# Patient Record
Sex: FEMALE | Race: WHITE | NOT HISPANIC OR LATINO | Employment: FULL TIME | URBAN - METROPOLITAN AREA
[De-identification: names, ages, dates, MRNs, and addresses within clinical notes are randomized per-mention and may not be internally consistent; named-entity substitution may affect disease eponyms.]

---

## 2018-01-01 ENCOUNTER — HOSPITAL ENCOUNTER (OUTPATIENT)
Dept: ULTRASOUND IMAGING | Facility: HOSPITAL | Age: 58
Discharge: HOME | End: 2018-11-28
Admitting: FAMILY MEDICINE

## 2018-01-01 ENCOUNTER — HOSPITAL ENCOUNTER (EMERGENCY)
Facility: HOSPITAL | Age: 58
Discharge: HOME | End: 2018-08-20
Attending: EMERGENCY MEDICINE | Admitting: EMERGENCY MEDICINE

## 2018-01-01 ENCOUNTER — APPOINTMENT (OUTPATIENT)
Dept: GENERAL RADIOLOGY | Facility: HOSPITAL | Age: 58
End: 2018-01-01

## 2018-01-01 ENCOUNTER — HOSPITAL ENCOUNTER (OUTPATIENT)
Facility: HOSPITAL | Age: 58
Setting detail: HOSPITAL OUTPATIENT SURGERY
Discharge: HOME | End: 2018-12-27
Attending: RADIOLOGY | Admitting: RADIOLOGY

## 2018-01-01 ENCOUNTER — APPOINTMENT (OUTPATIENT)
Dept: ULTRASOUND IMAGING | Facility: HOSPITAL | Age: 58
End: 2018-01-01

## 2018-01-01 ENCOUNTER — TRANSCRIBE ORDERS (OUTPATIENT)
Dept: ADMINISTRATIVE | Facility: HOSPITAL | Age: 58
End: 2018-01-01

## 2018-01-01 ENCOUNTER — PREP FOR SURGERY (OUTPATIENT)
Dept: OTHER | Facility: HOSPITAL | Age: 58
End: 2018-01-01

## 2018-01-01 ENCOUNTER — APPOINTMENT (OUTPATIENT)
Dept: MRI IMAGING | Facility: HOSPITAL | Age: 58
End: 2018-01-01

## 2018-01-01 ENCOUNTER — OFFICE VISIT (OUTPATIENT)
Dept: NEUROSURGERY | Facility: CLINIC | Age: 58
End: 2018-01-01

## 2018-01-01 VITALS
TEMPERATURE: 97.7 F | HEART RATE: 76 BPM | RESPIRATION RATE: 18 BRPM | OXYGEN SATURATION: 96 % | WEIGHT: 293 LBS | SYSTOLIC BLOOD PRESSURE: 154 MMHG | DIASTOLIC BLOOD PRESSURE: 74 MMHG | BODY MASS INDEX: 50.02 KG/M2 | HEIGHT: 64 IN

## 2018-01-01 VITALS
WEIGHT: 293 LBS | SYSTOLIC BLOOD PRESSURE: 130 MMHG | HEIGHT: 64 IN | DIASTOLIC BLOOD PRESSURE: 76 MMHG | BODY MASS INDEX: 50.02 KG/M2 | TEMPERATURE: 97.7 F

## 2018-01-01 VITALS
DIASTOLIC BLOOD PRESSURE: 77 MMHG | HEIGHT: 64 IN | OXYGEN SATURATION: 92 % | HEART RATE: 71 BPM | RESPIRATION RATE: 20 BRPM | SYSTOLIC BLOOD PRESSURE: 140 MMHG | BODY MASS INDEX: 48.65 KG/M2 | TEMPERATURE: 98 F | WEIGHT: 285 LBS

## 2018-01-01 DIAGNOSIS — G45.0 VERTEBROBASILAR INSUFFICIENCY: ICD-10-CM

## 2018-01-01 DIAGNOSIS — R42 VERTIGO: Primary | ICD-10-CM

## 2018-01-01 DIAGNOSIS — R10.11 RUQ PAIN: ICD-10-CM

## 2018-01-01 DIAGNOSIS — I65.03 STENOSIS OF BOTH VERTEBRAL ARTERIES: ICD-10-CM

## 2018-01-01 DIAGNOSIS — G45.0 VERTEBROBASILAR ARTERY INSUFFICIENCY: Primary | ICD-10-CM

## 2018-01-01 DIAGNOSIS — R10.11 RUQ PAIN: Primary | ICD-10-CM

## 2018-01-01 DIAGNOSIS — G45.0 VERTEBROBASILAR INSUFFICIENCY: Primary | ICD-10-CM

## 2018-01-01 LAB
ALBUMIN SERPL-MCNC: 4.19 G/DL (ref 3.2–4.8)
ALBUMIN/GLOB SERPL: 1.6 G/DL (ref 1.5–2.5)
ALP SERPL-CCNC: 128 U/L (ref 25–100)
ALT SERPL W P-5'-P-CCNC: 11 U/L (ref 7–40)
ANION GAP SERPL CALCULATED.3IONS-SCNC: 6 MMOL/L (ref 3–11)
ANION GAP SERPL CALCULATED.3IONS-SCNC: 8 MMOL/L (ref 3–11)
ARTICHOKE IGE QN: 102 MG/DL (ref 0–130)
AST SERPL-CCNC: 14 U/L (ref 0–33)
BASOPHILS # BLD AUTO: 0.06 10*3/MM3 (ref 0–0.2)
BASOPHILS NFR BLD AUTO: 0.4 % (ref 0–1)
BILIRUB SERPL-MCNC: 0.5 MG/DL (ref 0.3–1.2)
BUN BLD-MCNC: 14 MG/DL (ref 9–23)
BUN BLD-MCNC: 18 MG/DL (ref 9–23)
BUN/CREAT SERPL: 16.1 (ref 7–25)
BUN/CREAT SERPL: 18.8 (ref 7–25)
CALCIUM SPEC-SCNC: 9.1 MG/DL (ref 8.7–10.4)
CALCIUM SPEC-SCNC: 9.1 MG/DL (ref 8.7–10.4)
CHLORIDE SERPL-SCNC: 103 MMOL/L (ref 99–109)
CHLORIDE SERPL-SCNC: 105 MMOL/L (ref 99–109)
CHOLEST SERPL-MCNC: 159 MG/DL (ref 0–200)
CO2 SERPL-SCNC: 29 MMOL/L (ref 20–31)
CO2 SERPL-SCNC: 29 MMOL/L (ref 20–31)
CREAT BLD-MCNC: 0.87 MG/DL (ref 0.6–1.3)
CREAT BLD-MCNC: 0.96 MG/DL (ref 0.6–1.3)
DEPRECATED RDW RBC AUTO: 49 FL (ref 37–54)
DEPRECATED RDW RBC AUTO: 52.8 FL (ref 37–54)
EOSINOPHIL # BLD AUTO: 0.4 10*3/MM3 (ref 0–0.3)
EOSINOPHIL NFR BLD AUTO: 2.7 % (ref 0–3)
ERYTHROCYTE [DISTWIDTH] IN BLOOD BY AUTOMATED COUNT: 16.3 % (ref 11.3–14.5)
ERYTHROCYTE [DISTWIDTH] IN BLOOD BY AUTOMATED COUNT: 16.9 % (ref 11.3–14.5)
GFR SERPL CREATININE-BSD FRML MDRD: 59 ML/MIN/1.73
GFR SERPL CREATININE-BSD FRML MDRD: 67 ML/MIN/1.73
GLOBULIN UR ELPH-MCNC: 2.6 GM/DL
GLUCOSE BLD-MCNC: 116 MG/DL (ref 70–100)
GLUCOSE BLD-MCNC: 254 MG/DL (ref 70–100)
GLUCOSE BLDC GLUCOMTR-MCNC: 87 MG/DL (ref 70–130)
HBA1C MFR BLD: 8.1 % (ref 4.8–5.6)
HBA1C MFR BLD: 9.3 % (ref 4.8–5.6)
HCT VFR BLD AUTO: 45.1 % (ref 34.5–44)
HCT VFR BLD AUTO: 46 % (ref 34.5–44)
HDLC SERPL-MCNC: 41 MG/DL (ref 40–60)
HGB BLD-MCNC: 13.7 G/DL (ref 11.5–15.5)
HGB BLD-MCNC: 13.9 G/DL (ref 11.5–15.5)
HOLD SPECIMEN: NORMAL
HOLD SPECIMEN: NORMAL
IMM GRANULOCYTES # BLD: 0.07 10*3/MM3 (ref 0–0.03)
IMM GRANULOCYTES NFR BLD: 0.5 % (ref 0–0.6)
LYMPHOCYTES # BLD AUTO: 2.18 10*3/MM3 (ref 0.6–4.8)
LYMPHOCYTES NFR BLD AUTO: 14.5 % (ref 24–44)
MAGNESIUM SERPL-MCNC: 1.9 MG/DL (ref 1.3–2.7)
MCH RBC QN AUTO: 24.7 PG (ref 27–31)
MCH RBC QN AUTO: 25.7 PG (ref 27–31)
MCHC RBC AUTO-ENTMCNC: 30.2 G/DL (ref 32–36)
MCHC RBC AUTO-ENTMCNC: 30.4 G/DL (ref 32–36)
MCV RBC AUTO: 81.9 FL (ref 80–99)
MCV RBC AUTO: 84.5 FL (ref 80–99)
MONOCYTES # BLD AUTO: 0.85 10*3/MM3 (ref 0–1)
MONOCYTES NFR BLD AUTO: 5.6 % (ref 0–12)
NEUTROPHILS # BLD AUTO: 11.58 10*3/MM3 (ref 1.5–8.3)
NEUTROPHILS NFR BLD AUTO: 76.8 % (ref 41–71)
PLATELET # BLD AUTO: 263 10*3/MM3 (ref 150–450)
PLATELET # BLD AUTO: 286 10*3/MM3 (ref 150–450)
PMV BLD AUTO: 10.1 FL (ref 6–12)
PMV BLD AUTO: 9.6 FL (ref 6–12)
POTASSIUM BLD-SCNC: 4.2 MMOL/L (ref 3.5–5.5)
POTASSIUM BLD-SCNC: 4.5 MMOL/L (ref 3.5–5.5)
PROT SERPL-MCNC: 6.8 G/DL (ref 5.7–8.2)
RBC # BLD AUTO: 5.34 10*6/MM3 (ref 3.89–5.14)
RBC # BLD AUTO: 5.62 10*6/MM3 (ref 3.89–5.14)
SODIUM BLD-SCNC: 138 MMOL/L (ref 132–146)
SODIUM BLD-SCNC: 142 MMOL/L (ref 132–146)
TRIGL SERPL-MCNC: 146 MG/DL (ref 0–150)
TROPONIN I SERPL-MCNC: 0 NG/ML (ref 0–0.07)
TROPONIN I SERPL-MCNC: 0.01 NG/ML
WBC NRBC COR # BLD: 13.7 10*3/MM3 (ref 3.5–10.8)
WBC NRBC COR # BLD: 15.07 10*3/MM3 (ref 3.5–10.8)
WHOLE BLOOD HOLD SPECIMEN: NORMAL
WHOLE BLOOD HOLD SPECIMEN: NORMAL

## 2018-01-01 PROCEDURE — 85027 COMPLETE CBC AUTOMATED: CPT | Performed by: RADIOLOGY

## 2018-01-01 PROCEDURE — C1769 GUIDE WIRE: HCPCS | Performed by: RADIOLOGY

## 2018-01-01 PROCEDURE — C1894 INTRO/SHEATH, NON-LASER: HCPCS | Performed by: RADIOLOGY

## 2018-01-01 PROCEDURE — 83735 ASSAY OF MAGNESIUM: CPT | Performed by: EMERGENCY MEDICINE

## 2018-01-01 PROCEDURE — 84484 ASSAY OF TROPONIN QUANT: CPT | Performed by: PHYSICIAN ASSISTANT

## 2018-01-01 PROCEDURE — 80061 LIPID PANEL: CPT | Performed by: RADIOLOGY

## 2018-01-01 PROCEDURE — 36226 PLACE CATH VERTEBRAL ART: CPT | Performed by: RADIOLOGY

## 2018-01-01 PROCEDURE — 70548 MR ANGIOGRAPHY NECK W/DYE: CPT

## 2018-01-01 PROCEDURE — 80053 COMPREHEN METABOLIC PANEL: CPT | Performed by: EMERGENCY MEDICINE

## 2018-01-01 PROCEDURE — 70551 MRI BRAIN STEM W/O DYE: CPT

## 2018-01-01 PROCEDURE — 25010000002 FENTANYL CITRATE (PF) 100 MCG/2ML SOLUTION: Performed by: RADIOLOGY

## 2018-01-01 PROCEDURE — C1760 CLOSURE DEV, VASC: HCPCS | Performed by: RADIOLOGY

## 2018-01-01 PROCEDURE — 0 IODIXANOL PER 1 ML: Performed by: RADIOLOGY

## 2018-01-01 PROCEDURE — 82962 GLUCOSE BLOOD TEST: CPT

## 2018-01-01 PROCEDURE — 71045 X-RAY EXAM CHEST 1 VIEW: CPT

## 2018-01-01 PROCEDURE — 70544 MR ANGIOGRAPHY HEAD W/O DYE: CPT

## 2018-01-01 PROCEDURE — 36225 PLACE CATH SUBCLAVIAN ART: CPT | Performed by: RADIOLOGY

## 2018-01-01 PROCEDURE — 99285 EMERGENCY DEPT VISIT HI MDM: CPT

## 2018-01-01 PROCEDURE — 36415 COLL VENOUS BLD VENIPUNCTURE: CPT

## 2018-01-01 PROCEDURE — 84484 ASSAY OF TROPONIN QUANT: CPT

## 2018-01-01 PROCEDURE — 25010000002 MIDAZOLAM PER 1 MG: Performed by: RADIOLOGY

## 2018-01-01 PROCEDURE — 36223 PLACE CATH CAROTID/INOM ART: CPT | Performed by: RADIOLOGY

## 2018-01-01 PROCEDURE — 83036 HEMOGLOBIN GLYCOSYLATED A1C: CPT | Performed by: RADIOLOGY

## 2018-01-01 PROCEDURE — A9577 INJ MULTIHANCE: HCPCS | Performed by: EMERGENCY MEDICINE

## 2018-01-01 PROCEDURE — 80048 BASIC METABOLIC PNL TOTAL CA: CPT | Performed by: RADIOLOGY

## 2018-01-01 PROCEDURE — 83036 HEMOGLOBIN GLYCOSYLATED A1C: CPT | Performed by: PHYSICIAN ASSISTANT

## 2018-01-01 PROCEDURE — 0 GADOBENATE DIMEGLUMINE 529 MG/ML SOLUTION: Performed by: EMERGENCY MEDICINE

## 2018-01-01 PROCEDURE — 76705 ECHO EXAM OF ABDOMEN: CPT

## 2018-01-01 PROCEDURE — 93005 ELECTROCARDIOGRAM TRACING: CPT | Performed by: EMERGENCY MEDICINE

## 2018-01-01 PROCEDURE — 99204 OFFICE O/P NEW MOD 45 MIN: CPT | Performed by: RADIOLOGY

## 2018-01-01 PROCEDURE — 93005 ELECTROCARDIOGRAM TRACING: CPT

## 2018-01-01 PROCEDURE — 85025 COMPLETE CBC W/AUTO DIFF WBC: CPT | Performed by: EMERGENCY MEDICINE

## 2018-01-01 RX ORDER — SODIUM CHLORIDE 0.9 % (FLUSH) 0.9 %
3 SYRINGE (ML) INJECTION EVERY 12 HOURS SCHEDULED
Status: DISCONTINUED | OUTPATIENT
Start: 2018-01-01 | End: 2018-01-01 | Stop reason: HOSPADM

## 2018-01-01 RX ORDER — CLOPIDOGREL BISULFATE 75 MG/1
75 TABLET ORAL DAILY
COMMUNITY
Start: 2018-01-01

## 2018-01-01 RX ORDER — MECLIZINE HYDROCHLORIDE 25 MG/1
25 TABLET ORAL EVERY 6 HOURS PRN
Qty: 28 TABLET | Refills: 0 | Status: SHIPPED | OUTPATIENT
Start: 2018-01-01

## 2018-01-01 RX ORDER — ASPIRIN 81 MG/1
81 TABLET, COATED ORAL DAILY
COMMUNITY
Start: 2018-01-01

## 2018-01-01 RX ORDER — FENTANYL CITRATE 50 UG/ML
INJECTION, SOLUTION INTRAMUSCULAR; INTRAVENOUS AS NEEDED
Status: DISCONTINUED | OUTPATIENT
Start: 2018-01-01 | End: 2018-01-01 | Stop reason: HOSPADM

## 2018-01-01 RX ORDER — SODIUM CHLORIDE 9 MG/ML
50 INJECTION, SOLUTION INTRAVENOUS CONTINUOUS
Status: DISCONTINUED | OUTPATIENT
Start: 2018-01-01 | End: 2018-01-01 | Stop reason: HOSPADM

## 2018-01-01 RX ORDER — MECLIZINE HYDROCHLORIDE 25 MG/1
50 TABLET ORAL ONCE
Status: COMPLETED | OUTPATIENT
Start: 2018-01-01 | End: 2018-01-01

## 2018-01-01 RX ORDER — MIDAZOLAM HYDROCHLORIDE 1 MG/ML
INJECTION INTRAMUSCULAR; INTRAVENOUS AS NEEDED
Status: DISCONTINUED | OUTPATIENT
Start: 2018-01-01 | End: 2018-01-01 | Stop reason: HOSPADM

## 2018-01-01 RX ORDER — SODIUM CHLORIDE 0.9 % (FLUSH) 0.9 %
3-10 SYRINGE (ML) INJECTION AS NEEDED
Status: DISCONTINUED | OUTPATIENT
Start: 2018-01-01 | End: 2018-01-01 | Stop reason: HOSPADM

## 2018-01-01 RX ORDER — ATORVASTATIN CALCIUM 80 MG/1
80 TABLET, FILM COATED ORAL NIGHTLY
COMMUNITY
Start: 2018-01-01

## 2018-01-01 RX ORDER — SODIUM CHLORIDE 9 MG/ML
75 INJECTION, SOLUTION INTRAVENOUS CONTINUOUS
Status: ACTIVE | OUTPATIENT
Start: 2018-01-01 | End: 2018-01-01

## 2018-01-01 RX ORDER — SODIUM CHLORIDE 0.9 % (FLUSH) 0.9 %
1-10 SYRINGE (ML) INJECTION AS NEEDED
Status: DISCONTINUED | OUTPATIENT
Start: 2018-01-01 | End: 2018-01-01 | Stop reason: HOSPADM

## 2018-01-01 RX ORDER — SODIUM CHLORIDE 9 MG/ML
50 INJECTION, SOLUTION INTRAVENOUS CONTINUOUS
Status: CANCELLED | OUTPATIENT
Start: 2018-01-01

## 2018-01-01 RX ORDER — IODIXANOL 320 MG/ML
INJECTION, SOLUTION INTRAVASCULAR AS NEEDED
Status: DISCONTINUED | OUTPATIENT
Start: 2018-01-01 | End: 2018-01-01 | Stop reason: HOSPADM

## 2018-01-01 RX ORDER — LEVOTHYROXINE SODIUM 0.1 MG/1
100 TABLET ORAL DAILY
COMMUNITY
Start: 2018-01-01

## 2018-01-01 RX ORDER — CARVEDILOL 12.5 MG/1
12.5 TABLET ORAL 2 TIMES DAILY WITH MEALS
COMMUNITY
Start: 2018-01-01

## 2018-01-01 RX ORDER — INSULIN GLARGINE 100 [IU]/ML
100 INJECTION, SOLUTION SUBCUTANEOUS DAILY
COMMUNITY
Start: 2018-01-01

## 2018-01-01 RX ORDER — SODIUM CHLORIDE 0.9 % (FLUSH) 0.9 %
1-10 SYRINGE (ML) INJECTION AS NEEDED
Status: CANCELLED | OUTPATIENT
Start: 2018-01-01

## 2018-01-01 RX ORDER — SODIUM CHLORIDE 0.9 % (FLUSH) 0.9 %
10 SYRINGE (ML) INJECTION AS NEEDED
Status: DISCONTINUED | OUTPATIENT
Start: 2018-01-01 | End: 2018-01-01 | Stop reason: HOSPADM

## 2018-01-01 RX ORDER — LOSARTAN POTASSIUM 50 MG/1
50 TABLET ORAL DAILY
COMMUNITY
Start: 2018-01-01

## 2018-01-01 RX ORDER — SODIUM CHLORIDE 0.9 % (FLUSH) 0.9 %
3 SYRINGE (ML) INJECTION EVERY 12 HOURS SCHEDULED
Status: CANCELLED | OUTPATIENT
Start: 2018-01-01

## 2018-01-01 RX ADMIN — SODIUM CHLORIDE 50 ML/HR: 9 INJECTION, SOLUTION INTRAVENOUS at 07:12

## 2018-01-01 RX ADMIN — MECLIZINE 50 MG: 25 TABLET ORAL at 13:16

## 2018-01-01 RX ADMIN — GADOBENATE DIMEGLUMINE 15 ML: 529 INJECTION, SOLUTION INTRAVENOUS at 14:46

## 2018-08-20 NOTE — ED PROVIDER NOTES
"Subjective   58-year-old female presents to the emergency department with complaints of dizziness, nausea and vomiting.  The patient states that she was driving to a physical therapy appointment (for a left rotator cuff tear).  While driving, she suddenly became profoundly dizzy and had to pull over.  After a short time, her symptoms did not resolve and so she drove home.  At home, she was unable to get out of the car on her own due to dizziness and had to be assisted into the house.  She reportedly had an ataxic gait going into the house.  The patient describes her dizziness as \"spinning\".  No associated pain.  She notes that her dizziness is worse when she opens her eyes or if she moves about.  It resolves if she lies still with her eyes closed.  She also reports some left-sided neck pain.  She notes that her dizziness is worse if she turns her head to the left and improves if she turns her head to the right.  She has had some ringing in the left ear in recent days.  No ear pain.  No prior history of vertigo.  Past medical history includes hypertension, insulin-dependent diabetes type 2, coronary artery disease with a single stent, MI several years ago, COPD (not on O2).  She is a former smoker but quit 2 months ago.  No alcohol or drug use.  Her PCP is Dr. Dawn.              Review of Systems   Constitutional: Negative for chills and fever.   HENT: Negative for ear pain.         Some ringing in left ear   Eyes: Negative for visual disturbance.   Respiratory: Negative for cough and shortness of breath.    Cardiovascular: Negative for chest pain and palpitations.   Gastrointestinal: Positive for nausea and vomiting. Negative for abdominal pain and diarrhea.   Endocrine: Negative for polydipsia, polyphagia and polyuria.   Genitourinary: Negative for dysuria.   Musculoskeletal:        Chronic left shoulder pain   Skin: Negative for rash.   Allergic/Immunologic: Negative for immunocompromised state. "   Neurological: Positive for dizziness. Negative for speech difficulty, weakness and numbness.   Hematological: Negative.    Psychiatric/Behavioral: Negative.        Past Medical History:   Diagnosis Date   • COPD (chronic obstructive pulmonary disease) (CMS/HCC)    • Diabetes mellitus (CMS/HCC)    • Disease of thyroid gland    • Hernia, umbilical    • Hyperlipidemia    • Hypertension    • Myocardial infarction    • Pneumonia        No Known Allergies    Past Surgical History:   Procedure Laterality Date   • ADENOIDECTOMY     • APPENDECTOMY     •  SECTION     • CORONARY ANGIOPLASTY WITH STENT PLACEMENT     • HERNIA REPAIR     • TONSILLECTOMY     • TUMOR REMOVAL         History reviewed. No pertinent family history.    Social History     Social History   • Marital status:      Social History Main Topics   • Smoking status: Former Smoker     Years: 30.00     Types: Cigarettes     Quit date: 2018   • Alcohol use No   • Drug use: No     Other Topics Concern   • Not on file           Objective   Physical Exam   Constitutional: She is oriented to person, place, and time. She appears well-developed and well-nourished. No distress.   HENT:   Head: Normocephalic.   Nose: Nose normal.   Mouth/Throat: Oropharynx is clear and moist.   nml TMs   Eyes: Pupils are equal, round, and reactive to light. Conjunctivae are normal.   Mild nystagmus on lateral gaze   Neck: Normal range of motion. Neck supple.   No carotid bruits.   Cardiovascular: Normal rate, regular rhythm, normal heart sounds and intact distal pulses.    Pulmonary/Chest: Effort normal and breath sounds normal.   Abdominal: Soft. Bowel sounds are normal. There is no tenderness.   Musculoskeletal: Normal range of motion. She exhibits no tenderness.   Neurological: She is alert and oriented to person, place, and time.   Normal speech.  Normal tongue protrusion.  Normal facial symmetry.  Her  bilaterally.  No drift.   Skin: Skin is warm and dry.    Psychiatric: She has a normal mood and affect.       Critical Care  Performed by: QUIRINO BURTON  Authorized by: BLAKE LIEBERMAN     Critical care provider statement:     Critical care time (minutes):  60    Critical care time was exclusive of:  Separately billable procedures and treating other patients    Critical care was necessary to treat or prevent imminent or life-threatening deterioration of the following conditions: vertiginous symptoms with severe bilateral vertebral stenosis.    Critical care was time spent personally by me on the following activities:  Development of treatment plan with patient or surrogate, discussions with consultants, evaluation of patient's response to treatment, examination of patient, obtaining history from patient or surrogate, ordering and performing treatments and interventions, ordering and review of laboratory studies, ordering and review of radiographic studies, pulse oximetry, re-evaluation of patient's condition and review of old charts               ED Course      5:12 PM  The patient states that she feels back to normal after meclizine.  No reproducible dizziness at this time.  MRI of the brain, MRA of the brain and MRA of the neck were performed.  No acute intracranial abnormality is seen, however, there is severe stenosis of the left subclavian near the vertebral artery and also severe right proximal vertebral artery stenosis.  I spoke with Dr. Munir Jewell about the pt.  He advised that the pt should be on Plavix and ASA and a statin.  He also wanted to get a HgA1C.  He was going to send Sandrita down to see the pt.  He advised that she could go home and follow up in his office in 2 weeks.    Recent Results (from the past 24 hour(s))   Comprehensive Metabolic Panel    Collection Time: 08/20/18 12:01 PM   Result Value Ref Range    Glucose 254 (H) 70 - 100 mg/dL    BUN 14 9 - 23 mg/dL    Creatinine 0.87 0.60 - 1.30 mg/dL    Sodium 138 132 - 146 mmol/L     Potassium 4.5 3.5 - 5.5 mmol/L    Chloride 103 99 - 109 mmol/L    CO2 29.0 20.0 - 31.0 mmol/L    Calcium 9.1 8.7 - 10.4 mg/dL    Total Protein 6.8 5.7 - 8.2 g/dL    Albumin 4.19 3.20 - 4.80 g/dL    ALT (SGPT) 11 7 - 40 U/L    AST (SGOT) 14 0 - 33 U/L    Alkaline Phosphatase 128 (H) 25 - 100 U/L    Total Bilirubin 0.5 0.3 - 1.2 mg/dL    eGFR Non African Amer 67 >60 mL/min/1.73    Globulin 2.6 gm/dL    A/G Ratio 1.6 1.5 - 2.5 g/dL    BUN/Creatinine Ratio 16.1 7.0 - 25.0    Anion Gap 6.0 3.0 - 11.0 mmol/L   Magnesium    Collection Time: 08/20/18 12:01 PM   Result Value Ref Range    Magnesium 1.9 1.3 - 2.7 mg/dL   Light Blue Top    Collection Time: 08/20/18 12:01 PM   Result Value Ref Range    Extra Tube hold for add-on    Green Top (Gel)    Collection Time: 08/20/18 12:01 PM   Result Value Ref Range    Extra Tube Hold for add-ons.    Lavender Top    Collection Time: 08/20/18 12:01 PM   Result Value Ref Range    Extra Tube hold for add-on    Gold Top - SST    Collection Time: 08/20/18 12:01 PM   Result Value Ref Range    Extra Tube Hold for add-ons.    CBC Auto Differential    Collection Time: 08/20/18 12:01 PM   Result Value Ref Range    WBC 15.07 (H) 3.50 - 10.80 10*3/mm3    RBC 5.62 (H) 3.89 - 5.14 10*6/mm3    Hemoglobin 13.9 11.5 - 15.5 g/dL    Hematocrit 46.0 (H) 34.5 - 44.0 %    MCV 81.9 80.0 - 99.0 fL    MCH 24.7 (L) 27.0 - 31.0 pg    MCHC 30.2 (L) 32.0 - 36.0 g/dL    RDW 16.3 (H) 11.3 - 14.5 %    RDW-SD 49.0 37.0 - 54.0 fl    MPV 10.1 6.0 - 12.0 fL    Platelets 263 150 - 450 10*3/mm3    Neutrophil % 76.8 (H) 41.0 - 71.0 %    Lymphocyte % 14.5 (L) 24.0 - 44.0 %    Monocyte % 5.6 0.0 - 12.0 %    Eosinophil % 2.7 0.0 - 3.0 %    Basophil % 0.4 0.0 - 1.0 %    Immature Grans % 0.5 0.0 - 0.6 %    Neutrophils, Absolute 11.58 (H) 1.50 - 8.30 10*3/mm3    Lymphocytes, Absolute 2.18 0.60 - 4.80 10*3/mm3    Monocytes, Absolute 0.85 0.00 - 1.00 10*3/mm3    Eosinophils, Absolute 0.40 (H) 0.00 - 0.30 10*3/mm3    Basophils,  Absolute 0.06 0.00 - 0.20 10*3/mm3    Immature Grans, Absolute 0.07 (H) 0.00 - 0.03 10*3/mm3   Troponin    Collection Time: 08/20/18 12:01 PM   Result Value Ref Range    Troponin I 0.007 <=0.039 ng/mL   POC Troponin, Rapid    Collection Time: 08/20/18 12:04 PM   Result Value Ref Range    Troponin I 0.00 0.00 - 0.07 ng/mL     Note: In addition to lab results from this visit, the labs listed above may include labs taken at another facility or during a different encounter within the last 24 hours. Please correlate lab times with ED admission and discharge times for further clarification of the services performed during this visit.    MRI Brain Without Contrast   Preliminary Result   1.  Minimal chronic right maxillary sinusitis. No acute intracranial   abnormality is identified.   2.  Motion artifact degrades image quality of the intracranial vessels.   Narrowing of the proximal M2 branches on the right cannot be excluded.   3.  Severe stenosis of the proximal left subclavian artery just before   the vertebral bifurcation.   4.  Severe stenosis identified of the proximal right vertebral artery   with dominance identified of the right vertebral artery. No significant   stenosis of either of the common carotid arteries or internal carotid   arteries.       D:  08/20/2018   E:  08/20/2018              MRI Angiogram Head Without Contrast   Preliminary Result   1.  Minimal chronic right maxillary sinusitis. No acute intracranial   abnormality is identified.   2.  Motion artifact degrades image quality of the intracranial vessels.   Narrowing of the proximal M2 branches on the right cannot be excluded.   3.  Severe stenosis of the proximal left subclavian artery just before   the vertebral bifurcation.   4.  Severe stenosis identified of the proximal right vertebral artery   with dominance identified of the right vertebral artery. No significant   stenosis of either of the common carotid arteries or internal carotid    arteries.       D:  08/20/2018   E:  08/20/2018              MRI Angiogram Neck With Contrast   Preliminary Result   1.  Minimal chronic right maxillary sinusitis. No acute intracranial   abnormality is identified.   2.  Motion artifact degrades image quality of the intracranial vessels.   Narrowing of the proximal M2 branches on the right cannot be excluded.   3.  Severe stenosis of the proximal left subclavian artery just before   the vertebral bifurcation.   4.  Severe stenosis identified of the proximal right vertebral artery   with dominance identified of the right vertebral artery. No significant   stenosis of either of the common carotid arteries or internal carotid   arteries.       D:  08/20/2018   E:  08/20/2018              XR Chest 1 View   Final Result   No acute cardiopulmonary abnormality.       D:  08/20/2018   E:  08/20/2018       This report was finalized on 8/20/2018 12:36 PM by Silas Kennedy.            Vitals:    08/20/18 1630 08/20/18 1634 08/20/18 1642 08/20/18 1648   BP: 140/77      BP Location:       Patient Position:       Pulse:       Resp:       Temp:       TempSrc:       SpO2: 92% 92% 93% 92%   Weight:       Height:         Medications   sodium chloride 0.9 % flush 10 mL (not administered)   meclizine (ANTIVERT) tablet 50 mg (50 mg Oral Given 8/20/18 1316)   gadobenate dimeglumine (MULTIHANCE) injection 15 mL (15 mL Intravenous Given 8/20/18 1446)     ECG/EMG Results (last 24 hours)     Procedure Component Value Units Date/Time    ECG 12 Lead [[id number]] Collected:  08/20/18 1148     Updated:  08/20/18 1312                    [facility]      Final diagnoses:   Vertigo   Stenosis of both vertebral arteries            Tarik Calvillo, PA  08/20/18 7892

## 2018-08-20 NOTE — ED NOTES
"NAME: KRISTOFER SHEPARD  : 1960  PCP: Alin Nagy MD  Attending MD: Isael Gilbert MD    Date of Admission:  2018  Date of Service: 2018    CC: Dizziness, nausea/vertebro-basilar stenosis    History of Present Illness:  58 y.o.  female presented to the ED with complaints of dizziness, nausea and vomiting.  The patient states that she was driving to a physical therapy appointment related to a left rotator cuff tear when she suddenly became profoundly dizzy and had to pull to the side of the road.  After a short time, her symptoms did not resolve and so she drove home.  At home, she was unable to get out of the car on her own due to dizziness and had to be assisted into the house.  She reportedly had an ataxic gait going into the house.  The patient describes her dizziness as \"spinning\" and denies any pain.  She notes that her dizziness is worse when she opens her eyes or if she moves about.  It resolves if she lies still with her eyes closed.  She also reports some left-sided neck pain.  She notes that her dizziness is worse if she turns her head to the left and improves if she turns her head to the right.  She has had some ringing in the left ear in recent days.  No prior history of vertigo.   PMH includes hypertension, insulin-dependent diabetes type 2, coronary artery disease with a single stent, MI several years ago, COPD (not on O2).  She is a former smoker but quit 2 months ago.       Review of Systems:  Review of Systems - History obtained from the patient  Psychological ROS: negative  ENT ROS: positive for - vertigo  Hematological and Lymphatic ROS: negative  Endocrine ROS: negative  Respiratory ROS: no cough, shortness of breath, or wheezing  Cardiovascular ROS: negative for palpitations, chest pain  Gastrointestinal ROS: positive for - nausea/vomiting  Musculoskeletal ROS: chronic left shoulder pain  Neurological ROS: positive for - dizziness  All other systems reviewed and " negative      Past Medical History:  Past Medical History:   Diagnosis Date   • COPD (chronic obstructive pulmonary disease) (CMS/HCC)    • Diabetes mellitus (CMS/HCC)    • Disease of thyroid gland    • Hernia, umbilical    • Hyperlipidemia    • Hypertension    • Myocardial infarction    • Pneumonia        Past Surgical History:  Past Surgical History:   Procedure Laterality Date   • ADENOIDECTOMY     • APPENDECTOMY     •  SECTION     • CORONARY ANGIOPLASTY WITH STENT PLACEMENT     • HERNIA REPAIR     • TONSILLECTOMY     • TUMOR REMOVAL           Medications    (Not in a hospital admission)    Allergies:  No Known Allergies    Social Hx:  Social History     Social History   • Marital status:      Spouse name: N/A   • Number of children: N/A   • Years of education: N/A     Occupational History   • Not on file.     Social History Main Topics   • Smoking status: Former Smoker     Years: 30.00     Types: Cigarettes     Quit date: 2018   • Smokeless tobacco: Not on file   • Alcohol use No   • Drug use: No   • Sexual activity: Not on file     Other Topics Concern   • Not on file     Social History Narrative   • No narrative on file       Family Hx:  History reviewed. No pertinent family history.    Review of Imaging:  Mri Angiogram Head Without Contrast    Result Date: 2018  EXAMINATION: MRI BRAIN WO CONTRAST-, MRI ANGIOGRAM NECK W CONTRAST-, MRI ANGIOGRAM HEAD WO CONTRAST-2018:  INDICATION: Dizziness, unsteady gait.  TECHNIQUE: Routine multiplanar imaging was obtained of the brain without the administration of Gadolinium contrast. 3-D time-of-flight MR angiographic imaging was obtained of the intracranial vessels centered at the [place] without the administration of Gadolinium contrast. Source and maximum intensity projection images are available for evaluation. 3-D time-of-flight MR angiographic imaging was obtained of the carotid vessels within the neck following the  administration of Gadolinium contrast. Source and maximum intensity projection images are available for evaluation.  COMPARISON: NONE.  FINDINGS: There is no evidence of restricted diffusion to suggest evidence of an acute ischemic insult. Flow voids are preserved in the major intracranial vessels. The visualized paranasal sinuses are grossly clear. Globes and orbits are intact. The mastoid air cells are patent. No cerebellopontine angle mass identified. Pituitary and sella are unremarkable. Craniovertebral junction is preserved. Mucosal thickening involving the maxillary sinuses bilaterally. Mastoid air cells are patent. No hemorrhage or hydrocephalus. No mass, mass effect, or midline shift. No abnormal extra-axial fluid collection is identified.  MRA of the intracranial vessels reveals motion artifact degrading image quality. The motion artifact creates irregularity identified of the vessels. An area of narrowing of the proximal M2 branches on the right cannot be excluded. No large significant stenosis or aneurysmal dilatation identified.  The carotid vessels within the neck reveal a severe stenosis identified of the proximal right vertebral artery. The right vertebral artery is dominant. The left subclavian artery is extremely narrowed in its proximal portion. There is a severe stenosis identified with poststenotic dilatation of the left subclavian artery. There is atherosclerotic disease seen at the great vessels origin. No abnormality seen within the common carotid arteries bilaterally. There is no significant stenosis seen of either of the internal carotid arteries.      1.  Minimal chronic right maxillary sinusitis. No acute intracranial abnormality is identified. 2.  Motion artifact degrades image quality of the intracranial vessels. Narrowing of the proximal M2 branches on the right cannot be excluded. 3.  Severe stenosis of the proximal left subclavian artery just before the vertebral bifurcation. 4.   Severe stenosis identified of the proximal right vertebral artery with dominance identified of the right vertebral artery. No significant stenosis of either of the common carotid arteries or internal carotid arteries.  D:  08/20/2018 E:  08/20/2018       Mri Angiogram Neck With Contrast    Result Date: 8/20/2018  EXAMINATION: MRI BRAIN WO CONTRAST-, MRI ANGIOGRAM NECK W CONTRAST-, MRI ANGIOGRAM HEAD WO CONTRAST-08/20/2018:  INDICATION: Dizziness, unsteady gait.  TECHNIQUE: Routine multiplanar imaging was obtained of the brain without the administration of Gadolinium contrast. 3-D time-of-flight MR angiographic imaging was obtained of the intracranial vessels centered at the [place] without the administration of Gadolinium contrast. Source and maximum intensity projection images are available for evaluation. 3-D time-of-flight MR angiographic imaging was obtained of the carotid vessels within the neck following the administration of Gadolinium contrast. Source and maximum intensity projection images are available for evaluation.  COMPARISON: NONE.  FINDINGS: There is no evidence of restricted diffusion to suggest evidence of an acute ischemic insult. Flow voids are preserved in the major intracranial vessels. The visualized paranasal sinuses are grossly clear. Globes and orbits are intact. The mastoid air cells are patent. No cerebellopontine angle mass identified. Pituitary and sella are unremarkable. Craniovertebral junction is preserved. Mucosal thickening involving the maxillary sinuses bilaterally. Mastoid air cells are patent. No hemorrhage or hydrocephalus. No mass, mass effect, or midline shift. No abnormal extra-axial fluid collection is identified.  MRA of the intracranial vessels reveals motion artifact degrading image quality. The motion artifact creates irregularity identified of the vessels. An area of narrowing of the proximal M2 branches on the right cannot be excluded. No large significant  stenosis or aneurysmal dilatation identified.  The carotid vessels within the neck reveal a severe stenosis identified of the proximal right vertebral artery. The right vertebral artery is dominant. The left subclavian artery is extremely narrowed in its proximal portion. There is a severe stenosis identified with poststenotic dilatation of the left subclavian artery. There is atherosclerotic disease seen at the great vessels origin. No abnormality seen within the common carotid arteries bilaterally. There is no significant stenosis seen of either of the internal carotid arteries.      1.  Minimal chronic right maxillary sinusitis. No acute intracranial abnormality is identified. 2.  Motion artifact degrades image quality of the intracranial vessels. Narrowing of the proximal M2 branches on the right cannot be excluded. 3.  Severe stenosis of the proximal left subclavian artery just before the vertebral bifurcation. 4.  Severe stenosis identified of the proximal right vertebral artery with dominance identified of the right vertebral artery. No significant stenosis of either of the common carotid arteries or internal carotid arteries.  D:  08/20/2018 E:  08/20/2018       Mri Brain Without Contrast    Result Date: 8/20/2018  EXAMINATION: MRI BRAIN WO CONTRAST-, MRI ANGIOGRAM NECK W CONTRAST-, MRI ANGIOGRAM HEAD WO CONTRAST-08/20/2018:  INDICATION: Dizziness, unsteady gait.  TECHNIQUE: Routine multiplanar imaging was obtained of the brain without the administration of Gadolinium contrast. 3-D time-of-flight MR angiographic imaging was obtained of the intracranial vessels centered at the [place] without the administration of Gadolinium contrast. Source and maximum intensity projection images are available for evaluation. 3-D time-of-flight MR angiographic imaging was obtained of the carotid vessels within the neck following the administration of Gadolinium contrast. Source and maximum intensity projection  images are available for evaluation.  COMPARISON: NONE.  FINDINGS: There is no evidence of restricted diffusion to suggest evidence of an acute ischemic insult. Flow voids are preserved in the major intracranial vessels. The visualized paranasal sinuses are grossly clear. Globes and orbits are intact. The mastoid air cells are patent. No cerebellopontine angle mass identified. Pituitary and sella are unremarkable. Craniovertebral junction is preserved. Mucosal thickening involving the maxillary sinuses bilaterally. Mastoid air cells are patent. No hemorrhage or hydrocephalus. No mass, mass effect, or midline shift. No abnormal extra-axial fluid collection is identified.  MRA of the intracranial vessels reveals motion artifact degrading image quality. The motion artifact creates irregularity identified of the vessels. An area of narrowing of the proximal M2 branches on the right cannot be excluded. No large significant stenosis or aneurysmal dilatation identified.  The carotid vessels within the neck reveal a severe stenosis identified of the proximal right vertebral artery. The right vertebral artery is dominant. The left subclavian artery is extremely narrowed in its proximal portion. There is a severe stenosis identified with poststenotic dilatation of the left subclavian artery. There is atherosclerotic disease seen at the great vessels origin. No abnormality seen within the common carotid arteries bilaterally. There is no significant stenosis seen of either of the internal carotid arteries.      1.  Minimal chronic right maxillary sinusitis. No acute intracranial abnormality is identified. 2.  Motion artifact degrades image quality of the intracranial vessels. Narrowing of the proximal M2 branches on the right cannot be excluded. 3.  Severe stenosis of the proximal left subclavian artery just before the vertebral bifurcation. 4.  Severe stenosis identified of the proximal right vertebral artery with dominance  identified of the right vertebral artery. No significant stenosis of either of the common carotid arteries or internal carotid arteries.  D:  08/20/2018 E:  08/20/2018       I have discussed these with Dr. Escobar      Laboratory Result:  Lab Results   Component Value Date    WBC 15.07 (H) 08/20/2018    HGB 13.9 08/20/2018    HCT 46.0 (H) 08/20/2018    MCV 81.9 08/20/2018     08/20/2018     Lab Results   Component Value Date    GLUCOSE 254 (H) 08/20/2018    CALCIUM 9.1 08/20/2018     08/20/2018    K 4.5 08/20/2018    CO2 29.0 08/20/2018     08/20/2018    BUN 14 08/20/2018    CREATININE 0.87 08/20/2018    EGFRIFNONA 67 08/20/2018    BCR 16.1 08/20/2018    ANIONGAP 6.0 08/20/2018     No results found for: HGBA1C  No results found for: CHOL, CHLPL, TRIG, HDL, LDL, LDLDIRECT    Physical Examination:  Vitals:    08/20/18 1648   BP:    Pulse:    Resp:    Temp:    SpO2: 92%        General Appearance:   Well developed, well nourished, well groomed, alert, and cooperative.  Cardiovascular: Regular rate and rhythm. No carotid bruits    Neurological examination:  Interval: baseline  1a. Level of Consciousness: 0-->Alert, keenly responsive  1b. LOC Questions: 0-->Answers both questions correctly  1c. LOC Commands: 0-->Performs both tasks correctly  2. Best Gaze: 0-->Normal  3. Visual: 0-->No visual loss  4. Facial Palsy: 0-->Normal symmetrical movements  5a. Motor Arm, Left: 0-->No drift, limb holds 90 (or 45) degrees for full 10 secs  5b. Motor Arm, Right: 0-->No drift, limb holds 90 (or 45) degrees for full 10 secs  6a. Motor Leg, Left: 0-->No drift, leg holds 30 degree position for full 5 secs  6b. Motor Leg, Right: 0-->No drift, leg holds 30 degree position for full 5 secs  7. Limb Ataxia: 0-->Absent  8. Sensory: 0-->Normal, no sensory loss  9. Best Language: 0-->No aphasia, normal  10. Dysarthria: 0-->Normal  11. Extinction and Inattention (formerly Neglect): 0-->No abnormality    Total (NIH Stroke Scale):  0      Diagnoses/Plan:    Ms. De Leon is a 58 y.o. female who presents for evaluation of dizziness and vomiting.  Dr. Escobar has discussed plan of care with me and we would like her to continue her ASA, Plavix and Statin.  She may be discharged home and can follow up with her PCP to discuss results of her HgA1C.  She can follow up with Dr. Escobar in 2-3 weeks for evaluation of her MRI/MRA.  If she has any new or worsening symptoms I have discussed with her what would warrant a return to the ED.  She verbalizes understanding.

## 2018-08-20 NOTE — DISCHARGE INSTRUCTIONS
Rest.  Continue your Plavix, aspirin and cholesterol medicine, as well as your other regular meds.  Call Dr. Escobar tomorrow for follow up appointment in 2 weeks.  Meclizine as prescribed for dizziness.  Return if worse.

## 2018-12-06 NOTE — PROGRESS NOTES
NAME: KRISTOFER SHEPARD   DOS: 2018  : 1960  PCP: Alin Nagy MD    Chief Complaint:    Chief Complaint   Patient presents with   • Dizziness, near syncop     Vertebrobasilar insufficiency       History of Present Illness:  58 y.o. female who was seen in the [facility] emergency department on 2018, with episodes of dizziness and confusion, and was found to have high-grade bilateral vertebral artery stenoses.  She was on a dual antiplatelet regimen, along with statin therapy, and was scheduled to follow-up with me as an outpatient.  However, for various reasons, she presents today for follow-up of her vertebral artery stenoses and probable symptoms of vertebrobasilar insufficiency.  She's had at least 2-3 episodes of extreme dizziness and near syncope since her last hospitalization.  She is compliant with her dual antiplatelet regimen and statin therapy, but is admittedly a poorly controlled diabetic (hemoglobin A1c 9.3 on 2018).    Past Medical History:  Past Medical History:   Diagnosis Date   • COPD (chronic obstructive pulmonary disease) (CMS/HCC)    • Diabetes mellitus (CMS/HCC)    • Disease of thyroid gland    • Hernia, umbilical    • Hyperlipidemia    • Hypertension    • Myocardial infarction (CMS/HCC)    • Pneumonia        Past Surgical History:  Past Surgical History:   Procedure Laterality Date   • ADENOIDECTOMY     • APPENDECTOMY     •  SECTION     • CORONARY ANGIOPLASTY WITH STENT PLACEMENT     • HERNIA REPAIR     • TONSILLECTOMY     • TUMOR REMOVAL         Review of Systems:        Review of Systems   Constitutional: Negative for activity change, appetite change, chills, diaphoresis, fatigue, fever and unexpected weight change.   HENT: Negative for congestion, dental problem, drooling, ear discharge, ear pain, facial swelling, hearing loss, mouth sores, nosebleeds, postnasal drip, rhinorrhea, sinus pressure, sneezing, sore throat, tinnitus, trouble swallowing  and voice change.    Eyes: Negative for photophobia, pain, discharge, redness, itching and visual disturbance.   Respiratory: Negative for apnea, cough, choking, chest tightness, shortness of breath, wheezing and stridor.    Cardiovascular: Negative for chest pain, palpitations and leg swelling.   Gastrointestinal: Negative for abdominal distention, abdominal pain, anal bleeding, blood in stool, constipation, diarrhea, nausea, rectal pain and vomiting.   Endocrine: Negative for cold intolerance, heat intolerance, polydipsia, polyphagia and polyuria.   Genitourinary: Negative for decreased urine volume, difficulty urinating, dysuria, enuresis, flank pain, frequency, genital sores, hematuria and urgency.   Musculoskeletal: Negative for arthralgias, back pain, gait problem, joint swelling, myalgias, neck pain and neck stiffness.   Skin: Negative for color change, pallor, rash and wound.   Allergic/Immunologic: Negative for environmental allergies, food allergies and immunocompromised state.   Neurological: Positive for dizziness. Negative for tremors, seizures, syncope, facial asymmetry, speech difficulty, weakness, light-headedness, numbness and headaches.   Hematological: Negative for adenopathy. Does not bruise/bleed easily.   Psychiatric/Behavioral: Negative for agitation, behavioral problems, confusion, decreased concentration, dysphoric mood, hallucinations, self-injury, sleep disturbance and suicidal ideas. The patient is not nervous/anxious and is not hyperactive.         Medications    Current Outpatient Medications:   •  ASPIRIN ADULT LOW DOSE 81 MG EC tablet, , Disp: , Rfl:   •  atorvastatin (LIPITOR) 80 MG tablet, , Disp: , Rfl:   •  carvedilol (COREG) 12.5 MG tablet, , Disp: , Rfl:   •  LANTUS 100 UNIT/ML injection, , Disp: , Rfl:   •  levothyroxine (SYNTHROID, LEVOTHROID) 100 MCG tablet, , Disp: , Rfl:   •  losartan (COZAAR) 50 MG tablet, , Disp: , Rfl:   •  meclizine (ANTIVERT) 25 MG tablet, Take 1  "tablet by mouth Every 6 (Six) Hours As Needed for dizziness., Disp: 28 tablet, Rfl: 0  •  NOVOLOG 100 UNIT/ML injection, , Disp: , Rfl:   •  clopidogrel (PLAVIX) 75 MG tablet, , Disp: , Rfl:     Allergies:  No Known Allergies    Social Hx:  Social History     Tobacco Use   • Smoking status: Former Smoker     Years: 30.00     Types: Cigarettes     Last attempt to quit: 2018     Years since quittin.4   Substance Use Topics   • Alcohol use: No   • Drug use: No       Family Hx:  History reviewed. No pertinent family history.    Review of Imaging:  MR angiogram of the head neck dated 2018 demonstrates focal areas of \"high-grade\" stenosis involving the right vertebral artery origin and the pre--vertebral portions of the left subclavian artery.  The right vertebral artery is dominant in nature, and the left vertebral artery is markedly hypoplastic and appears to terminate into the PICA on the prior MR angiogram of the head.  Mild plaque formation is noted involving the origin of the left common carotid artery.  The carotid bifurcations are without hemodynamically significant disease.  Carotid duplex from the vascular lab at the [facility] dated 2018 demonstrates high-grade stenoses involving the bilateral vertebral artery origins, with peak velocities of 294 cm/s on the left, and at 231 cm/s on the right.    Physical Examination:  Vitals:    18 1328   BP: 130/76   Temp: 97.7 °F (36.5 °C)        General Appearance:   Well developed, well nourished, well groomed, alert, and cooperative.  Cardiovascular: Regular rate and rhythm. No carotid bruits      Neurological examination:  Neurologic Exam     Mental Status   Oriented to person, place, and time.   Speech: speech is normal   Level of consciousness: alert    Cranial Nerves   Cranial nerves II through XII intact.     Motor Exam     Strength   Strength 5/5 throughout.     Sensory Exam   Light touch normal.     Gait, Coordination, and " "Reflexes     Gait  Gait: normal      Diagnoses/Plan:    Ms. De Leon is a 58 y.o. female recurrent episodes of dizziness and near syncope which appear to represent to vertebrobasilar insufficiency related to high-grade stenoses involving the cervical vertebral and subclavian vasculature.  Her right vertebral artery is clearly the \"dominant\" vertebral and has a \"high-grade\" stenosis involving the right vertebral artery origin on prior MR angiogram.  I would estimate her right vertebral artery stenosis to be in excess of 90%.  Given her recurrent symptoms of vertebrobasilar insufficiency, despite aggressive medical therapy with a dual antiplatelet regimen and statin therapy, I think it is reasonable to pursue diagnostic angiography with possible intervention (vertebral artery origin stent).  Ms. De Leon is in agreement with this, and wishes to pursue treatment as soon as possible.  We will make arrangements for her to undergo diagnostic angiography with possible vertebral intervention in the next couple of weeks or so.    During the interim, she is going to follow-up with her PCP in regards to aggressive diabetes control.          "

## 2018-12-19 PROBLEM — G45.0 VERTEBROBASILAR INSUFFICIENCY: Status: ACTIVE | Noted: 2018-01-01

## 2019-01-01 ENCOUNTER — APPOINTMENT (OUTPATIENT)
Dept: CT IMAGING | Facility: HOSPITAL | Age: 59
End: 2019-01-01

## 2019-01-01 ENCOUNTER — APPOINTMENT (OUTPATIENT)
Dept: GENERAL RADIOLOGY | Facility: HOSPITAL | Age: 59
End: 2019-01-01

## 2019-01-01 ENCOUNTER — APPOINTMENT (OUTPATIENT)
Dept: ULTRASOUND IMAGING | Facility: HOSPITAL | Age: 59
End: 2019-01-01

## 2019-01-01 ENCOUNTER — HOSPITAL ENCOUNTER (INPATIENT)
Facility: HOSPITAL | Age: 59
LOS: 1 days | End: 2019-01-23
Attending: EMERGENCY MEDICINE | Admitting: INTERNAL MEDICINE

## 2019-01-01 VITALS
OXYGEN SATURATION: 94 % | TEMPERATURE: 98.5 F | HEIGHT: 63 IN | DIASTOLIC BLOOD PRESSURE: 29 MMHG | WEIGHT: 285 LBS | SYSTOLIC BLOOD PRESSURE: 50 MMHG | BODY MASS INDEX: 50.5 KG/M2 | RESPIRATION RATE: 12 BRPM | HEART RATE: 94 BPM

## 2019-01-01 DIAGNOSIS — K52.9 GASTROENTERITIS: Primary | ICD-10-CM

## 2019-01-01 DIAGNOSIS — R10.84 GENERALIZED ABDOMINAL PAIN: ICD-10-CM

## 2019-01-01 DIAGNOSIS — J96.02 ACUTE RESPIRATORY FAILURE WITH HYPOXIA AND HYPERCAPNIA (HCC): ICD-10-CM

## 2019-01-01 DIAGNOSIS — R11.2 NAUSEA, VOMITING, AND DIARRHEA: ICD-10-CM

## 2019-01-01 DIAGNOSIS — J96.02 ACUTE RESPIRATORY FAILURE WITH HYPERCAPNIA (HCC): ICD-10-CM

## 2019-01-01 DIAGNOSIS — R09.02 HYPOXIA: ICD-10-CM

## 2019-01-01 DIAGNOSIS — J96.01 ACUTE RESPIRATORY FAILURE WITH HYPOXIA AND HYPERCAPNIA (HCC): ICD-10-CM

## 2019-01-01 DIAGNOSIS — R19.7 NAUSEA, VOMITING, AND DIARRHEA: ICD-10-CM

## 2019-01-01 DIAGNOSIS — K43.9 ABDOMINAL WALL HERNIA: ICD-10-CM

## 2019-01-01 LAB
ALBUMIN SERPL-MCNC: 3.93 G/DL (ref 3.2–4.8)
ALBUMIN/GLOB SERPL: 1.7 G/DL (ref 1.5–2.5)
ALP SERPL-CCNC: 138 U/L (ref 25–100)
ALT SERPL W P-5'-P-CCNC: 13 U/L (ref 7–40)
AMPHET+METHAMPHET UR QL: NEGATIVE
AMPHETAMINES UR QL: NEGATIVE
ANION GAP SERPL CALCULATED.3IONS-SCNC: 7 MMOL/L (ref 3–11)
ANION GAP SERPL CALCULATED.3IONS-SCNC: 9 MMOL/L (ref 3–11)
ARTERIAL PATENCY WRIST A: ABNORMAL
AST SERPL-CCNC: 16 U/L (ref 0–33)
ATMOSPHERIC PRESS: ABNORMAL MMHG
BACTERIA UR QL AUTO: ABNORMAL /HPF
BARBITURATES UR QL SCN: NEGATIVE
BASE EXCESS BLDA CALC-SCNC: -2.5 MMOL/L (ref 0–2)
BASE EXCESS BLDA CALC-SCNC: -4.9 MMOL/L (ref 0–2)
BASE EXCESS BLDA CALC-SCNC: -8.7 MMOL/L (ref 0–2)
BASOPHILS # BLD AUTO: 0.03 10*3/MM3 (ref 0–0.2)
BASOPHILS # BLD MANUAL: 0 10*3/MM3 (ref 0–0.2)
BASOPHILS NFR BLD AUTO: 0 % (ref 0–1)
BASOPHILS NFR BLD AUTO: 0.2 % (ref 0–1)
BDY SITE: ABNORMAL
BENZODIAZ UR QL SCN: NEGATIVE
BILIRUB SERPL-MCNC: 0.9 MG/DL (ref 0.3–1.2)
BILIRUB UR QL STRIP: ABNORMAL
BODY TEMPERATURE: 37 C
BUN BLD-MCNC: 21 MG/DL (ref 9–23)
BUN BLD-MCNC: 31 MG/DL (ref 9–23)
BUN/CREAT SERPL: 14.8 (ref 7–25)
BUN/CREAT SERPL: 18.9 (ref 7–25)
BUPRENORPHINE SERPL-MCNC: NEGATIVE NG/ML
CALCIUM SPEC-SCNC: 7.2 MG/DL (ref 8.7–10.4)
CALCIUM SPEC-SCNC: 8.3 MG/DL (ref 8.7–10.4)
CANNABINOIDS SERPL QL: NEGATIVE
CHLORIDE SERPL-SCNC: 103 MMOL/L (ref 99–109)
CHLORIDE SERPL-SCNC: 109 MMOL/L (ref 99–109)
CLARITY UR: ABNORMAL
CO2 BLDA-SCNC: 23.7 MMOL/L (ref 23–27)
CO2 BLDA-SCNC: 28.5 MMOL/L (ref 23–27)
CO2 BLDA-SCNC: 31.2 MMOL/L (ref 23–27)
CO2 SERPL-SCNC: 25 MMOL/L (ref 20–31)
CO2 SERPL-SCNC: 28 MMOL/L (ref 20–31)
COCAINE UR QL: NEGATIVE
COHGB MFR BLD: 2.4 % (ref 0–2)
COHGB MFR BLD: 3.7 % (ref 0–2)
COHGB MFR BLD: 4.1 % (ref 0–2)
COLOR UR: ABNORMAL
CREAT BLD-MCNC: 1.11 MG/DL (ref 0.6–1.3)
CREAT BLD-MCNC: 2.1 MG/DL (ref 0.6–1.3)
D-LACTATE SERPL-SCNC: 1.4 MMOL/L (ref 0.5–2)
D-LACTATE SERPL-SCNC: 2.1 MMOL/L (ref 0.5–2)
D-LACTATE SERPL-SCNC: 2.2 MMOL/L (ref 0.5–2)
DEPRECATED RDW RBC AUTO: 50.7 FL (ref 37–54)
DEPRECATED RDW RBC AUTO: 53.3 FL (ref 37–54)
EOSINOPHIL # BLD AUTO: 0.09 10*3/MM3 (ref 0–0.3)
EOSINOPHIL # BLD MANUAL: 0 10*3/MM3 (ref 0.1–0.3)
EOSINOPHIL NFR BLD AUTO: 0.6 % (ref 0–3)
EOSINOPHIL NFR BLD MANUAL: 0 % (ref 0–3)
EPAP: 10
ERYTHROCYTE [DISTWIDTH] IN BLOOD BY AUTOMATED COUNT: 16.1 % (ref 11.3–14.5)
ERYTHROCYTE [DISTWIDTH] IN BLOOD BY AUTOMATED COUNT: 16.4 % (ref 11.3–14.5)
GFR SERPL CREATININE-BSD FRML MDRD: 24 ML/MIN/1.73
GFR SERPL CREATININE-BSD FRML MDRD: 50 ML/MIN/1.73
GLOBULIN UR ELPH-MCNC: 2.4 GM/DL
GLUCOSE BLD-MCNC: 220 MG/DL (ref 70–100)
GLUCOSE BLD-MCNC: 230 MG/DL (ref 70–100)
GLUCOSE BLDC GLUCOMTR-MCNC: 179 MG/DL (ref 70–130)
GLUCOSE BLDC GLUCOMTR-MCNC: 217 MG/DL (ref 70–130)
GLUCOSE UR STRIP-MCNC: NEGATIVE MG/DL
HBA1C MFR BLD: 8.5 % (ref 4.8–5.6)
HCO3 BLDA-SCNC: 21.6 MMOL/L (ref 20–26)
HCO3 BLDA-SCNC: 26.1 MMOL/L (ref 20–26)
HCO3 BLDA-SCNC: 28.7 MMOL/L (ref 20–26)
HCT VFR BLD AUTO: 47.8 % (ref 34.5–44)
HCT VFR BLD AUTO: 50.8 % (ref 34.5–44)
HCT VFR BLD CALC: 40.3 %
HCT VFR BLD CALC: 43.1 %
HCT VFR BLD CALC: 46.8 %
HGB BLD-MCNC: 13.8 G/DL (ref 11.5–15.5)
HGB BLD-MCNC: 15.2 G/DL (ref 11.5–15.5)
HGB BLDA-MCNC: 13.2 G/DL (ref 14–18)
HGB BLDA-MCNC: 14.1 G/DL (ref 14–18)
HGB BLDA-MCNC: 15.3 G/DL (ref 14–18)
HGB UR QL STRIP.AUTO: NEGATIVE
HOLD SPECIMEN: NORMAL
HOROWITZ INDEX BLD+IHG-RTO: 40 %
HOROWITZ INDEX BLD+IHG-RTO: 60 %
HOROWITZ INDEX BLD+IHG-RTO: 80 %
HYALINE CASTS UR QL AUTO: ABNORMAL /LPF
HYPOCHROMIA BLD QL: ABNORMAL
IMM GRANULOCYTES # BLD AUTO: 0.04 10*3/MM3 (ref 0–0.03)
IMM GRANULOCYTES NFR BLD AUTO: 0.3 % (ref 0–0.6)
IPAP: 20
KETONES UR QL STRIP: ABNORMAL
LEUKOCYTE ESTERASE UR QL STRIP.AUTO: ABNORMAL
LIPASE SERPL-CCNC: 25 U/L (ref 6–51)
LYMPHOCYTES # BLD AUTO: 2.21 10*3/MM3 (ref 0.6–4.8)
LYMPHOCYTES # BLD MANUAL: 0.98 10*3/MM3 (ref 0.6–4.8)
LYMPHOCYTES NFR BLD AUTO: 14.7 % (ref 24–44)
LYMPHOCYTES NFR BLD MANUAL: 6 % (ref 24–44)
LYMPHOCYTES NFR BLD MANUAL: 9 % (ref 0–12)
Lab: ABNORMAL
MCH RBC QN AUTO: 25.6 PG (ref 27–31)
MCH RBC QN AUTO: 25.7 PG (ref 27–31)
MCHC RBC AUTO-ENTMCNC: 28.9 G/DL (ref 32–36)
MCHC RBC AUTO-ENTMCNC: 29.9 G/DL (ref 32–36)
MCV RBC AUTO: 85.7 FL (ref 80–99)
MCV RBC AUTO: 89 FL (ref 80–99)
METAMYELOCYTES NFR BLD MANUAL: 1 % (ref 0–0)
METHADONE UR QL SCN: NEGATIVE
METHGB BLD QL: 0.7 % (ref 0–1.5)
METHGB BLD QL: 1.1 % (ref 0–1.5)
METHGB BLD QL: 1.1 % (ref 0–1.5)
MODALITY: ABNORMAL
MONOCYTES # BLD AUTO: 0.79 10*3/MM3 (ref 0–1)
MONOCYTES # BLD AUTO: 1.48 10*3/MM3 (ref 0–1)
MONOCYTES NFR BLD AUTO: 5.3 % (ref 0–12)
NEUTROPHILS # BLD AUTO: 11.87 10*3/MM3 (ref 1.5–8.3)
NEUTROPHILS # BLD AUTO: 13.62 10*3/MM3 (ref 1.5–8.3)
NEUTROPHILS NFR BLD AUTO: 79.2 % (ref 41–71)
NEUTROPHILS NFR BLD MANUAL: 33 % (ref 41–71)
NEUTS BAND NFR BLD MANUAL: 50 % (ref 0–5)
NITRITE UR QL STRIP: NEGATIVE
NOTE: ABNORMAL
NOTIFIED BY: ABNORMAL
NOTIFIED WHO: ABNORMAL
NRBC SPEC MANUAL: 2 /100 WBC (ref 0–0)
OPIATES UR QL: NEGATIVE
OXYCODONE UR QL SCN: NEGATIVE
OXYHGB MFR BLDV: 79.4 % (ref 94–99)
OXYHGB MFR BLDV: 91.9 % (ref 94–99)
OXYHGB MFR BLDV: 94.8 % (ref 94–99)
PCO2 BLDA: 67 MM HG (ref 35–45)
PCO2 BLDA: 78.4 MM HG (ref 35–45)
PCO2 BLDA: 80.3 MM HG (ref 35–45)
PCO2 TEMP ADJ BLD: 67 MM HG (ref 35–45)
PCO2 TEMP ADJ BLD: 78.4 MM HG (ref 35–45)
PCO2 TEMP ADJ BLD: 80.3 MM HG (ref 35–45)
PCP UR QL SCN: NEGATIVE
PH BLDA: 7.12 PH UNITS (ref 7.35–7.45)
PH BLDA: 7.13 PH UNITS (ref 7.35–7.45)
PH BLDA: 7.16 PH UNITS (ref 7.35–7.45)
PH UR STRIP.AUTO: <=5 [PH] (ref 5–8)
PH, TEMP CORRECTED: 7.12 PH UNITS
PH, TEMP CORRECTED: 7.13 PH UNITS
PH, TEMP CORRECTED: 7.16 PH UNITS
PLAT MORPH BLD: NORMAL
PLATELET # BLD AUTO: 370 10*3/MM3 (ref 150–450)
PLATELET # BLD AUTO: 381 10*3/MM3 (ref 150–450)
PMV BLD AUTO: 10.6 FL (ref 6–12)
PMV BLD AUTO: 9.8 FL (ref 6–12)
PO2 BLDA: 100 MM HG (ref 83–108)
PO2 BLDA: 102 MM HG (ref 83–108)
PO2 BLDA: 56.3 MM HG (ref 83–108)
PO2 TEMP ADJ BLD: 100 MM HG (ref 83–108)
PO2 TEMP ADJ BLD: 102 MM HG (ref 83–108)
PO2 TEMP ADJ BLD: 56.3 MM HG (ref 83–108)
POLYCHROMASIA BLD QL SMEAR: ABNORMAL
POTASSIUM BLD-SCNC: 4.4 MMOL/L (ref 3.5–5.5)
POTASSIUM BLD-SCNC: 5.1 MMOL/L (ref 3.5–5.5)
PROCALCITONIN SERPL-MCNC: >100 NG/ML
PROPOXYPH UR QL: NEGATIVE
PROT SERPL-MCNC: 6.3 G/DL (ref 5.7–8.2)
PROT UR QL STRIP: ABNORMAL
RBC # BLD AUTO: 5.37 10*6/MM3 (ref 3.89–5.14)
RBC # BLD AUTO: 5.93 10*6/MM3 (ref 3.89–5.14)
RBC # UR: ABNORMAL /HPF
REF LAB TEST METHOD: ABNORMAL
SODIUM BLD-SCNC: 140 MMOL/L (ref 132–146)
SODIUM BLD-SCNC: 141 MMOL/L (ref 132–146)
SP GR UR STRIP: 1.04 (ref 1–1.03)
SQUAMOUS #/AREA URNS HPF: ABNORMAL /HPF
TRICYCLICS UR QL SCN: NEGATIVE
TROPONIN I SERPL-MCNC: 0 NG/ML (ref 0–0.07)
TSH SERPL DL<=0.05 MIU/L-ACNC: 5.11 MIU/ML (ref 0.35–5.35)
UROBILINOGEN UR QL STRIP: ABNORMAL
VARIANT LYMPHS NFR BLD MANUAL: 1 % (ref 0–5)
VENTILATOR MODE: ABNORMAL
VENTILATOR MODE: ABNORMAL
WBC MORPH BLD: NORMAL
WBC NRBC COR # BLD: 14.99 10*3/MM3 (ref 3.5–10.8)
WBC NRBC COR # BLD: 16.41 10*3/MM3 (ref 3.5–10.8)
WBC UR QL AUTO: ABNORMAL /HPF
WHOLE BLOOD HOLD SPECIMEN: NORMAL
WHOLE BLOOD HOLD SPECIMEN: NORMAL

## 2019-01-01 PROCEDURE — 99292 CRITICAL CARE ADDL 30 MIN: CPT | Performed by: INTERNAL MEDICINE

## 2019-01-01 PROCEDURE — 25010000002 PROMETHAZINE PER 50 MG: Performed by: EMERGENCY MEDICINE

## 2019-01-01 PROCEDURE — 80306 DRUG TEST PRSMV INSTRMNT: CPT | Performed by: INTERNAL MEDICINE

## 2019-01-01 PROCEDURE — 74177 CT ABD & PELVIS W/CONTRAST: CPT

## 2019-01-01 PROCEDURE — 83605 ASSAY OF LACTIC ACID: CPT | Performed by: INTERNAL MEDICINE

## 2019-01-01 PROCEDURE — 25010000002 MORPHINE PER 10 MG: Performed by: INTERNAL MEDICINE

## 2019-01-01 PROCEDURE — 83605 ASSAY OF LACTIC ACID: CPT | Performed by: EMERGENCY MEDICINE

## 2019-01-01 PROCEDURE — 99285 EMERGENCY DEPT VISIT HI MDM: CPT

## 2019-01-01 PROCEDURE — 93005 ELECTROCARDIOGRAM TRACING: CPT | Performed by: EMERGENCY MEDICINE

## 2019-01-01 PROCEDURE — 25010000002 ONDANSETRON PER 1 MG: Performed by: EMERGENCY MEDICINE

## 2019-01-01 PROCEDURE — 83036 HEMOGLOBIN GLYCOSYLATED A1C: CPT | Performed by: NURSE PRACTITIONER

## 2019-01-01 PROCEDURE — 25010000002 METHYLPREDNISOLONE PER 125 MG: Performed by: EMERGENCY MEDICINE

## 2019-01-01 PROCEDURE — 02HV33Z INSERTION OF INFUSION DEVICE INTO SUPERIOR VENA CAVA, PERCUTANEOUS APPROACH: ICD-10-PCS | Performed by: INTERNAL MEDICINE

## 2019-01-01 PROCEDURE — 25010000002 PIPERACILLIN SOD-TAZOBACTAM PER 1 G: Performed by: EMERGENCY MEDICINE

## 2019-01-01 PROCEDURE — 94799 UNLISTED PULMONARY SVC/PX: CPT

## 2019-01-01 PROCEDURE — 25010000002 IOPAMIDOL 61 % SOLUTION: Performed by: EMERGENCY MEDICINE

## 2019-01-01 PROCEDURE — 84145 PROCALCITONIN (PCT): CPT | Performed by: PHYSICIAN ASSISTANT

## 2019-01-01 PROCEDURE — 25010000002 PHENYLEPHRINE 10 MG/ML SOLUTION: Performed by: INTERNAL MEDICINE

## 2019-01-01 PROCEDURE — 36556 INSERT NON-TUNNEL CV CATH: CPT | Performed by: INTERNAL MEDICINE

## 2019-01-01 PROCEDURE — 63710000001 INSULIN REGULAR HUMAN PER 5 UNITS: Performed by: NURSE PRACTITIONER

## 2019-01-01 PROCEDURE — 99291 CRITICAL CARE FIRST HOUR: CPT | Performed by: INTERNAL MEDICINE

## 2019-01-01 PROCEDURE — 82805 BLOOD GASES W/O2 SATURATION: CPT

## 2019-01-01 PROCEDURE — 84443 ASSAY THYROID STIM HORMONE: CPT | Performed by: NURSE PRACTITIONER

## 2019-01-01 PROCEDURE — 82962 GLUCOSE BLOOD TEST: CPT

## 2019-01-01 PROCEDURE — 85025 COMPLETE CBC W/AUTO DIFF WBC: CPT | Performed by: EMERGENCY MEDICINE

## 2019-01-01 PROCEDURE — 94640 AIRWAY INHALATION TREATMENT: CPT

## 2019-01-01 PROCEDURE — 25010000002 PIPERACILLIN SOD-TAZOBACTAM PER 1 G: Performed by: INTERNAL MEDICINE

## 2019-01-01 PROCEDURE — 36600 WITHDRAWAL OF ARTERIAL BLOOD: CPT

## 2019-01-01 PROCEDURE — 81001 URINALYSIS AUTO W/SCOPE: CPT | Performed by: EMERGENCY MEDICINE

## 2019-01-01 PROCEDURE — 25010000002 FENTANYL CITRATE (PF) 100 MCG/2ML SOLUTION: Performed by: EMERGENCY MEDICINE

## 2019-01-01 PROCEDURE — 71275 CT ANGIOGRAPHY CHEST: CPT

## 2019-01-01 PROCEDURE — 80053 COMPREHEN METABOLIC PANEL: CPT | Performed by: EMERGENCY MEDICINE

## 2019-01-01 PROCEDURE — 25010000002 ENOXAPARIN PER 10 MG: Performed by: NURSE PRACTITIONER

## 2019-01-01 PROCEDURE — 84484 ASSAY OF TROPONIN QUANT: CPT

## 2019-01-01 PROCEDURE — 74018 RADEX ABDOMEN 1 VIEW: CPT

## 2019-01-01 PROCEDURE — 25010000002 VANCOMYCIN 10 G RECONSTITUTED SOLUTION: Performed by: EMERGENCY MEDICINE

## 2019-01-01 PROCEDURE — 71045 X-RAY EXAM CHEST 1 VIEW: CPT

## 2019-01-01 PROCEDURE — 80048 BASIC METABOLIC PNL TOTAL CA: CPT | Performed by: PHYSICIAN ASSISTANT

## 2019-01-01 PROCEDURE — 99254 IP/OBS CNSLTJ NEW/EST MOD 60: CPT | Performed by: INTERNAL MEDICINE

## 2019-01-01 PROCEDURE — 99238 HOSP IP/OBS DSCHRG MGMT 30/<: CPT | Performed by: NURSE PRACTITIONER

## 2019-01-01 PROCEDURE — 25010000002 HEPARIN (PORCINE) PER 1000 UNITS: Performed by: PHYSICIAN ASSISTANT

## 2019-01-01 PROCEDURE — 0 DIATRIZOATE MEGLUMINE & SODIUM PER 1 ML: Performed by: EMERGENCY MEDICINE

## 2019-01-01 PROCEDURE — 83690 ASSAY OF LIPASE: CPT | Performed by: EMERGENCY MEDICINE

## 2019-01-01 PROCEDURE — 94660 CPAP INITIATION&MGMT: CPT

## 2019-01-01 PROCEDURE — 85025 COMPLETE CBC W/AUTO DIFF WBC: CPT | Performed by: PHYSICIAN ASSISTANT

## 2019-01-01 RX ORDER — HEPARIN SODIUM 5000 [USP'U]/ML
5000 INJECTION, SOLUTION INTRAVENOUS; SUBCUTANEOUS EVERY 8 HOURS SCHEDULED
Status: DISCONTINUED | OUTPATIENT
Start: 2019-01-01 | End: 2019-01-01 | Stop reason: HOSPADM

## 2019-01-01 RX ORDER — IPRATROPIUM BROMIDE AND ALBUTEROL SULFATE 2.5; .5 MG/3ML; MG/3ML
3 SOLUTION RESPIRATORY (INHALATION) ONCE
Status: COMPLETED | OUTPATIENT
Start: 2019-01-01 | End: 2019-01-01

## 2019-01-01 RX ORDER — ONDANSETRON 2 MG/ML
4 INJECTION INTRAMUSCULAR; INTRAVENOUS EVERY 6 HOURS PRN
Status: DISCONTINUED | OUTPATIENT
Start: 2019-01-01 | End: 2019-01-01 | Stop reason: HOSPADM

## 2019-01-01 RX ORDER — SODIUM CHLORIDE 0.9 % (FLUSH) 0.9 %
3-10 SYRINGE (ML) INJECTION AS NEEDED
Status: DISCONTINUED | OUTPATIENT
Start: 2019-01-01 | End: 2019-01-01 | Stop reason: HOSPADM

## 2019-01-01 RX ORDER — NICOTINE POLACRILEX 4 MG
15 LOZENGE BUCCAL
Status: DISCONTINUED | OUTPATIENT
Start: 2019-01-01 | End: 2019-01-01 | Stop reason: HOSPADM

## 2019-01-01 RX ORDER — IPRATROPIUM BROMIDE AND ALBUTEROL SULFATE 2.5; .5 MG/3ML; MG/3ML
3 SOLUTION RESPIRATORY (INHALATION) EVERY 4 HOURS PRN
Status: DISCONTINUED | OUTPATIENT
Start: 2019-01-01 | End: 2019-01-01 | Stop reason: HOSPADM

## 2019-01-01 RX ORDER — SODIUM CHLORIDE 9 MG/ML
100 INJECTION, SOLUTION INTRAVENOUS CONTINUOUS
Status: DISCONTINUED | OUTPATIENT
Start: 2019-01-01 | End: 2019-01-01

## 2019-01-01 RX ORDER — LEVOTHYROXINE SODIUM ANHYDROUS 100 UG/5ML
50 INJECTION, POWDER, LYOPHILIZED, FOR SOLUTION INTRAVENOUS
Status: DISCONTINUED | OUTPATIENT
Start: 2019-01-01 | End: 2019-01-01 | Stop reason: HOSPADM

## 2019-01-01 RX ORDER — CARVEDILOL 12.5 MG/1
12.5 TABLET ORAL 2 TIMES DAILY WITH MEALS
Status: CANCELLED | OUTPATIENT
Start: 2019-01-01

## 2019-01-01 RX ORDER — ASPIRIN 81 MG/1
81 TABLET ORAL DAILY
Status: DISCONTINUED | OUTPATIENT
Start: 2019-01-01 | End: 2019-01-01 | Stop reason: SDUPTHER

## 2019-01-01 RX ORDER — CLOPIDOGREL BISULFATE 75 MG/1
75 TABLET ORAL DAILY
Status: DISCONTINUED | OUTPATIENT
Start: 2019-01-01 | End: 2019-01-01 | Stop reason: SDUPTHER

## 2019-01-01 RX ORDER — PROMETHAZINE HYDROCHLORIDE 25 MG/ML
12.5 INJECTION, SOLUTION INTRAMUSCULAR; INTRAVENOUS ONCE AS NEEDED
Status: COMPLETED | OUTPATIENT
Start: 2019-01-01 | End: 2019-01-01

## 2019-01-01 RX ORDER — IPRATROPIUM BROMIDE AND ALBUTEROL SULFATE 2.5; .5 MG/3ML; MG/3ML
3 SOLUTION RESPIRATORY (INHALATION)
Status: DISCONTINUED | OUTPATIENT
Start: 2019-01-01 | End: 2019-01-01

## 2019-01-01 RX ORDER — DEXTROSE MONOHYDRATE 25 G/50ML
25 INJECTION, SOLUTION INTRAVENOUS
Status: DISCONTINUED | OUTPATIENT
Start: 2019-01-01 | End: 2019-01-01 | Stop reason: HOSPADM

## 2019-01-01 RX ORDER — SODIUM CHLORIDE 0.9 % (FLUSH) 0.9 %
3 SYRINGE (ML) INJECTION EVERY 12 HOURS SCHEDULED
Status: DISCONTINUED | OUTPATIENT
Start: 2019-01-01 | End: 2019-01-01 | Stop reason: HOSPADM

## 2019-01-01 RX ORDER — ASPIRIN 81 MG/1
81 TABLET, CHEWABLE ORAL DAILY
Status: DISCONTINUED | OUTPATIENT
Start: 2019-01-01 | End: 2019-01-01

## 2019-01-01 RX ORDER — IPRATROPIUM BROMIDE AND ALBUTEROL SULFATE 2.5; .5 MG/3ML; MG/3ML
3 SOLUTION RESPIRATORY (INHALATION)
Status: DISCONTINUED | OUTPATIENT
Start: 2019-01-01 | End: 2019-01-01 | Stop reason: HOSPADM

## 2019-01-01 RX ORDER — SODIUM CHLORIDE 0.9 % (FLUSH) 0.9 %
10 SYRINGE (ML) INJECTION AS NEEDED
Status: DISCONTINUED | OUTPATIENT
Start: 2019-01-01 | End: 2019-01-01 | Stop reason: HOSPADM

## 2019-01-01 RX ORDER — SODIUM CHLORIDE 9 MG/ML
125 INJECTION, SOLUTION INTRAVENOUS CONTINUOUS
Status: DISCONTINUED | OUTPATIENT
Start: 2019-01-01 | End: 2019-01-01 | Stop reason: HOSPADM

## 2019-01-01 RX ORDER — CLOPIDOGREL BISULFATE 75 MG/1
75 TABLET ORAL DAILY
Status: DISCONTINUED | OUTPATIENT
Start: 2019-01-01 | End: 2019-01-01

## 2019-01-01 RX ORDER — LEVOTHYROXINE SODIUM 0.1 MG/1
100 TABLET ORAL DAILY
Status: DISCONTINUED | OUTPATIENT
Start: 2019-01-01 | End: 2019-01-01 | Stop reason: SDUPTHER

## 2019-01-01 RX ORDER — LOSARTAN POTASSIUM 50 MG/1
50 TABLET ORAL DAILY
Status: CANCELLED | OUTPATIENT
Start: 2019-01-01

## 2019-01-01 RX ORDER — ONDANSETRON 2 MG/ML
4 INJECTION INTRAMUSCULAR; INTRAVENOUS ONCE
Status: COMPLETED | OUTPATIENT
Start: 2019-01-01 | End: 2019-01-01

## 2019-01-01 RX ORDER — NOREPINEPHRINE BIT/0.9 % NACL 8 MG/250ML
.02-.3 INFUSION BOTTLE (ML) INTRAVENOUS
Status: DISCONTINUED | OUTPATIENT
Start: 2019-01-01 | End: 2019-01-01 | Stop reason: HOSPADM

## 2019-01-01 RX ORDER — MECLIZINE HYDROCHLORIDE 25 MG/1
25 TABLET ORAL EVERY 6 HOURS PRN
Status: DISCONTINUED | OUTPATIENT
Start: 2019-01-01 | End: 2019-01-01 | Stop reason: HOSPADM

## 2019-01-01 RX ORDER — FENTANYL CITRATE 50 UG/ML
25 INJECTION, SOLUTION INTRAMUSCULAR; INTRAVENOUS
Status: DISCONTINUED | OUTPATIENT
Start: 2019-01-01 | End: 2019-01-01

## 2019-01-01 RX ORDER — FENTANYL CITRATE 50 UG/ML
50 INJECTION, SOLUTION INTRAMUSCULAR; INTRAVENOUS ONCE
Status: COMPLETED | OUTPATIENT
Start: 2019-01-01 | End: 2019-01-01

## 2019-01-01 RX ORDER — ATORVASTATIN CALCIUM 40 MG/1
80 TABLET, FILM COATED ORAL NIGHTLY
Status: DISCONTINUED | OUTPATIENT
Start: 2019-01-01 | End: 2019-01-01

## 2019-01-01 RX ORDER — SODIUM CHLORIDE 9 MG/ML
125 INJECTION, SOLUTION INTRAVENOUS CONTINUOUS
Status: DISCONTINUED | OUTPATIENT
Start: 2019-01-01 | End: 2019-01-01

## 2019-01-01 RX ORDER — PHENYLEPHRINE HCL IN 0.9% NACL 0.5 MG/5ML
.5-3 SYRINGE (ML) INTRAVENOUS
Status: DISCONTINUED | OUTPATIENT
Start: 2019-01-01 | End: 2019-01-01 | Stop reason: HOSPADM

## 2019-01-01 RX ORDER — METHYLPREDNISOLONE SODIUM SUCCINATE 125 MG/2ML
125 INJECTION, POWDER, LYOPHILIZED, FOR SOLUTION INTRAMUSCULAR; INTRAVENOUS ONCE
Status: COMPLETED | OUTPATIENT
Start: 2019-01-01 | End: 2019-01-01

## 2019-01-01 RX ORDER — MORPHINE SULFATE 1 MG/ML
2-30 INJECTION INTRAVENOUS
Status: DISCONTINUED | OUTPATIENT
Start: 2019-01-01 | End: 2019-01-01 | Stop reason: HOSPADM

## 2019-01-01 RX ORDER — ACETAMINOPHEN 325 MG/1
650 TABLET ORAL EVERY 4 HOURS PRN
Status: DISCONTINUED | OUTPATIENT
Start: 2019-01-01 | End: 2019-01-01 | Stop reason: HOSPADM

## 2019-01-01 RX ADMIN — FENTANYL CITRATE 25 MCG: 50 INJECTION, SOLUTION INTRAMUSCULAR; INTRAVENOUS at 22:53

## 2019-01-01 RX ADMIN — INSULIN HUMAN 5 UNITS: 100 INJECTION, SOLUTION PARENTERAL at 13:33

## 2019-01-01 RX ADMIN — HEPARIN SODIUM 5000 UNITS: 5000 INJECTION, SOLUTION INTRAVENOUS; SUBCUTANEOUS at 21:57

## 2019-01-01 RX ADMIN — PHENYLEPHRINE HYDROCHLORIDE 0.5 MCG/KG/MIN: 10 INJECTION INTRAVENOUS at 20:30

## 2019-01-01 RX ADMIN — MORPHINE SULFATE: 1 INJECTION, SOLUTION INTRAVENOUS at 23:01

## 2019-01-01 RX ADMIN — METHYLPREDNISOLONE SODIUM SUCCINATE 125 MG: 125 INJECTION, POWDER, FOR SOLUTION INTRAMUSCULAR; INTRAVENOUS at 03:46

## 2019-01-01 RX ADMIN — SODIUM CHLORIDE 1000 ML: 9 INJECTION, SOLUTION INTRAVENOUS at 04:08

## 2019-01-01 RX ADMIN — HEPARIN SODIUM 5000 UNITS: 5000 INJECTION, SOLUTION INTRAVENOUS; SUBCUTANEOUS at 13:34

## 2019-01-01 RX ADMIN — PHENYLEPHRINE HYDROCHLORIDE 0.5 MCG/KG/MIN: 10 INJECTION INTRAVENOUS at 07:07

## 2019-01-01 RX ADMIN — TAZOBACTAM SODIUM AND PIPERACILLIN SODIUM 4.5 G: 500; 4 INJECTION, SOLUTION INTRAVENOUS at 04:52

## 2019-01-01 RX ADMIN — SODIUM BICARBONATE 100 MEQ: 84 INJECTION INTRAVENOUS at 13:23

## 2019-01-01 RX ADMIN — PHENYLEPHRINE HYDROCHLORIDE 3 MCG/KG/MIN: 10 INJECTION INTRAVENOUS at 14:50

## 2019-01-01 RX ADMIN — SODIUM CHLORIDE 125 ML/HR: 9 INJECTION, SOLUTION INTRAVENOUS at 22:59

## 2019-01-01 RX ADMIN — ONDANSETRON 4 MG: 2 INJECTION INTRAMUSCULAR; INTRAVENOUS at 22:32

## 2019-01-01 RX ADMIN — NOREPINEPHRINE BITARTRATE 0.2 MCG/KG/MIN: 8 SOLUTION at 17:35

## 2019-01-01 RX ADMIN — PROMETHAZINE HYDROCHLORIDE 12.5 MG: 25 INJECTION INTRAMUSCULAR; INTRAVENOUS at 22:36

## 2019-01-01 RX ADMIN — METRONIDAZOLE 500 MG: 500 INJECTION, SOLUTION INTRAVENOUS at 05:49

## 2019-01-01 RX ADMIN — NOREPINEPHRINE BITARTRATE 2 MCG/KG/MIN: 8 SOLUTION at 23:24

## 2019-01-01 RX ADMIN — IPRATROPIUM BROMIDE AND ALBUTEROL SULFATE 3 ML: 2.5; .5 SOLUTION RESPIRATORY (INHALATION) at 08:16

## 2019-01-01 RX ADMIN — DIATRIZOATE MEGLUMINE AND DIATRIZOATE SODIUM 15 ML: 660; 100 LIQUID ORAL; RECTAL at 22:57

## 2019-01-01 RX ADMIN — MORPHINE SULFATE: 1 INJECTION, SOLUTION INTRAVENOUS at 14:39

## 2019-01-01 RX ADMIN — TAZOBACTAM SODIUM AND PIPERACILLIN SODIUM 3.38 G: 375; 3 INJECTION, SOLUTION INTRAVENOUS at 13:24

## 2019-01-01 RX ADMIN — NOREPINEPHRINE BITARTRATE 0.02 MCG/KG/MIN: 8 SOLUTION at 10:55

## 2019-01-01 RX ADMIN — IPRATROPIUM BROMIDE AND ALBUTEROL SULFATE 3 ML: 2.5; .5 SOLUTION RESPIRATORY (INHALATION) at 00:30

## 2019-01-01 RX ADMIN — PHENYLEPHRINE HYDROCHLORIDE 3 MCG/KG/MIN: 10 INJECTION INTRAVENOUS at 01:19

## 2019-01-01 RX ADMIN — LEVOTHYROXINE SODIUM ANHYDROUS 50 MCG: 100 INJECTION, POWDER, LYOPHILIZED, FOR SOLUTION INTRAVENOUS at 14:40

## 2019-01-01 RX ADMIN — ENOXAPARIN SODIUM 40 MG: 40 INJECTION SUBCUTANEOUS at 13:24

## 2019-01-01 RX ADMIN — TAZOBACTAM SODIUM AND PIPERACILLIN SODIUM 3.38 G: 375; 3 INJECTION, SOLUTION INTRAVENOUS at 20:23

## 2019-01-01 RX ADMIN — IPRATROPIUM BROMIDE AND ALBUTEROL SULFATE 3 ML: 2.5; .5 SOLUTION RESPIRATORY (INHALATION) at 14:00

## 2019-01-01 RX ADMIN — IOPAMIDOL 100 ML: 612 INJECTION, SOLUTION INTRAVENOUS at 01:06

## 2019-01-01 RX ADMIN — SODIUM CHLORIDE 125 ML/HR: 9 INJECTION, SOLUTION INTRAVENOUS at 01:49

## 2019-01-01 RX ADMIN — VANCOMYCIN HYDROCHLORIDE 2500 MG: 10 INJECTION, POWDER, LYOPHILIZED, FOR SOLUTION INTRAVENOUS at 04:58

## 2019-01-01 RX ADMIN — PHENYLEPHRINE HYDROCHLORIDE 3 MCG/KG/MIN: 10 INJECTION INTRAVENOUS at 22:57

## 2019-01-01 RX ADMIN — SODIUM CHLORIDE 125 ML/HR: 9 INJECTION, SOLUTION INTRAVENOUS at 05:59

## 2019-01-01 RX ADMIN — IPRATROPIUM BROMIDE AND ALBUTEROL SULFATE 3 ML: 2.5; .5 SOLUTION RESPIRATORY (INHALATION) at 18:52

## 2019-01-01 RX ADMIN — PHENYLEPHRINE HYDROCHLORIDE 3 MCG/KG/MIN: 10 INJECTION INTRAVENOUS at 10:53

## 2019-01-01 RX ADMIN — IPRATROPIUM BROMIDE AND ALBUTEROL SULFATE 3 ML: 2.5; .5 SOLUTION RESPIRATORY (INHALATION) at 03:52

## 2019-01-01 RX ADMIN — MICAFUNGIN SODIUM 100 MG: 20 INJECTION, POWDER, LYOPHILIZED, FOR SOLUTION INTRAVENOUS at 13:34

## 2019-01-01 RX ADMIN — SODIUM CHLORIDE 1000 ML: 9 INJECTION, SOLUTION INTRAVENOUS at 06:07

## 2019-01-01 RX ADMIN — PHENYLEPHRINE HYDROCHLORIDE 3 MCG/KG/MIN: 10 INJECTION INTRAVENOUS at 17:47

## 2019-01-01 RX ADMIN — FENTANYL CITRATE 50 MCG: 50 INJECTION, SOLUTION INTRAMUSCULAR; INTRAVENOUS at 22:30

## 2019-01-01 RX ADMIN — SODIUM CHLORIDE 100 ML/HR: 9 INJECTION, SOLUTION INTRAVENOUS at 10:56

## 2019-01-22 PROBLEM — A41.9 SEPTIC SHOCK (HCC): Status: ACTIVE | Noted: 2019-01-01

## 2019-01-22 PROBLEM — E03.9 HYPOTHYROIDISM (ACQUIRED): Status: ACTIVE | Noted: 2019-01-01

## 2019-01-22 PROBLEM — R09.02 HYPOXIA: Status: ACTIVE | Noted: 2019-01-01

## 2019-01-22 PROBLEM — E66.01 MORBID OBESITY WITH BMI OF 50.0-59.9, ADULT (HCC): Status: ACTIVE | Noted: 2019-01-01

## 2019-01-22 PROBLEM — I10 ESSENTIAL HYPERTENSION: Status: ACTIVE | Noted: 2019-01-01

## 2019-01-22 PROBLEM — K52.9 GASTROENTERITIS: Status: ACTIVE | Noted: 2019-01-01

## 2019-01-22 PROBLEM — J44.9 COPD (CHRONIC OBSTRUCTIVE PULMONARY DISEASE) (HCC): Status: ACTIVE | Noted: 2019-01-01

## 2019-01-22 PROBLEM — I25.10 CAD (CORONARY ARTERY DISEASE): Status: ACTIVE | Noted: 2019-01-01

## 2019-01-22 PROBLEM — N17.9 ACUTE RENAL FAILURE (ARF) (HCC): Status: ACTIVE | Noted: 2019-01-01

## 2019-01-22 PROBLEM — J96.02 ACUTE RESPIRATORY FAILURE WITH HYPERCAPNIA (HCC): Status: ACTIVE | Noted: 2019-01-01

## 2019-01-22 PROBLEM — G47.33 OSA (OBSTRUCTIVE SLEEP APNEA): Status: ACTIVE | Noted: 2019-01-01

## 2019-01-22 PROBLEM — E11.9 DM2 (DIABETES MELLITUS, TYPE 2) (HCC): Status: ACTIVE | Noted: 2019-01-01

## 2019-01-22 PROBLEM — E66.2 OBESITY HYPOVENTILATION SYNDROME (HCC): Status: ACTIVE | Noted: 2019-01-01

## 2019-01-22 PROBLEM — R65.21 SEPTIC SHOCK (HCC): Status: ACTIVE | Noted: 2019-01-01

## 2019-01-22 PROBLEM — E78.5 HYPERLIPIDEMIA: Status: ACTIVE | Noted: 2019-01-01

## 2019-01-22 NOTE — PROCEDURES
"Insert Central Line At Bedside  Date/Time: 1/22/2019 11:35 AM  Performed by: Jeyson Renee MD  Authorized by: Jeyson Renee MD   Consent: Verbal consent obtained. Written consent obtained.  Consent given by: daughter.  Required items: required blood products, implants, devices, and special equipment available  Patient identity confirmed: verbally with patient and arm band  Time out: Immediately prior to procedure a \"time out\" was called to verify the correct patient, procedure, equipment, support staff and site/side marked as required.  Indications: vascular access and central pressure monitoring  Anesthesia: local infiltration    Anesthesia:  Local Anesthetic: lidocaine 1% without epinephrine  Anesthetic total: 5 mL  Preparation: skin prepped with ChloraPrep  Skin prep agent dried: skin prep agent completely dried prior to procedure  Sterile barriers: all five maximum sterile barriers used - cap, mask, sterile gown, sterile gloves, and large sterile sheet  Hand hygiene: hand hygiene performed prior to central venous catheter insertion  Location details: left internal jugular  Patient position: flat  Catheter type: triple lumen  Catheter size: 7 Fr  Pre-procedure: landmarks identified  Ultrasound guidance: yes  Sterile ultrasound techniques: sterile gel and sterile probe covers were used  Number of attempts: 1  Successful placement: yes  Post-procedure: line sutured  Assessment: free fluid flow and blood return through all ports (stat portable CXR pending)  Patient tolerance: Patient tolerated the procedure well with no immediate complications (EBL < 5 mL. )              "

## 2019-01-22 NOTE — CONSULTS
Here to place PICC and was notified by staff RN that it was no longer needed.  Central line placed at bedside.

## 2019-01-22 NOTE — H&P
[facility] Medicine Services  HISTORY AND PHYSICAL    Patient Name: Bozena De Leon  : 1960  MRN: [id number]  Primary Care Physician: Alin Nagy MD  Date of admission: 2019      Subjective   Subjective     Chief Complaint:  Abdominal pain    HPI:  Bozena De Leon is a 58 y.o. female with a past medical history significant for umbilical hernia, DM, COPD, MI, hypertension, hyperlipidemia, CAD with stent placement, and morbid obesity who presents with complaints of diffuse abdominal pain going on for the past two days. Abdominal pain is diffuse and constant. Characterized as a crampy sensation with bloating. There is associated nausea with non bloody vomiting and diarrhea, subjective fever and generalized weakness. Upon arrival to patient's room she notes SOB which is worse with lying flat. Patient is visibly dyspneic and struggling to carry on a conversation. Skin is borderline cyanotic. There has no recent cough, congestion, chest pain or syncope. She is reformed smoker. Will admit for further evaluation and treatment.    Emergency Department Evaluation; hypotensive to 99/31. . RR 24. . WBC 15.0. CT abdomen demonstrates possible infectious enteritis. CT chest negative for acute cardiopulmonary process. EKG without acute changes.       Review of Systems   Constitutional: Positive for chills and fever.   HENT: Negative for congestion and trouble swallowing.    Eyes: Negative for photophobia and visual disturbance.   Respiratory: Positive for shortness of breath. Negative for cough.    Cardiovascular: Negative for chest pain and leg swelling.   Gastrointestinal: Positive for abdominal pain, diarrhea, nausea and vomiting.   Endocrine: Negative for cold intolerance.   Genitourinary: Negative for dysuria and flank pain.   Musculoskeletal: Negative for back pain and gait problem.   Skin: Negative for pallor and rash.   Allergic/Immunologic: Negative for  immunocompromised state.   Neurological: Negative for dizziness, weakness and headaches.   Hematological: Negative for adenopathy.   Psychiatric/Behavioral: Negative for agitation and confusion.          Otherwise complete ROS reviewed and is negative except as mentioned in the HPI.    Personal History     Past Medical History:   Diagnosis Date   • COPD (chronic obstructive pulmonary disease) (CMS/HCC)    • Coronary artery disease    • Diabetes mellitus (CMS/HCC)    • Disease of thyroid gland    • Hernia, umbilical    • Hyperlipidemia    • Hypertension    • Myocardial infarction (CMS/HCC)    • Pneumonia    • Syncope        Past Surgical History:   Procedure Laterality Date   • ADENOIDECTOMY     • APPENDECTOMY     • CARDIAC CATHETERIZATION     •  SECTION     • CORONARY ANGIOPLASTY WITH STENT PLACEMENT     • CORONARY STENT PLACEMENT     • HERNIA REPAIR     • INTERVENTIONAL RADIOLOGY PROCEDURE N/A 2018    Procedure: Vertebral artery stent;  Surgeon: Tanner Escobar MD;  Location: [facility] INVASIVE LOCATION;  Service: Interventional Radiology   • TONSILLECTOMY     • TUMOR REMOVAL      fibroid       Family History: family history is not on file. Otherwise pertinent FHx was reviewed and unremarkable.     Social History:  reports that she quit smoking about 3 years ago. Her smoking use included cigarettes. She quit after 30.00 years of use. She does not have any smokeless tobacco history on file. She reports that she does not drink alcohol or use drugs.  Social History     Social History Narrative   • Not on file       Medications:    Available home medication information reviewed.    (Not in a hospital admission)    No Known Allergies    Objective   Objective     Vital Signs:   Temp:  [97.7 °F (36.5 °C)] 97.7 °F (36.5 °C)  Heart Rate:  [] 89  Resp:  [20-24] 20  BP: ()/() 142/77  FiO2 (%):  [60 %] 60 %        Physical Exam   Constitutional: No acute distress, lethargic and dyspneic,  morbidly obese  Eyes: PERRLA, sclerae anicteric, no conjunctival injection  HENT: NCAT, mucous membranes moist  Neck: Supple, no thyromegaly, no lymphadenopathy, trachea midline  Respiratory: Clear to auscultation bilaterally, nonlabored respirations   Cardiovascular: RRR, no murmurs, rubs, or gallops, palpable pedal pulses bilaterally  Gastrointestinal: Positive bowel sounds, soft, tender to palpation diffusely. Large ventral hernia  Musculoskeletal: No bilateral ankle edema, no clubbing or cyanosis to extremities  Psychiatric: Appropriate affect, cooperative  Neurologic: Oriented x 3, strength symmetric in all extremities, Cranial Nerves grossly intact to confrontation, speech clear  Skin: No rashes      Results Reviewed:  I have personally reviewed current lab, radiology, and data and agree.    Results from last 7 days   Lab Units 01/21/19  2247   WBC 10*3/mm3 14.99*   HEMOGLOBIN g/dL 15.2   HEMATOCRIT % 50.8*   PLATELETS 10*3/mm3 370     Results from last 7 days   Lab Units 01/21/19  2255 01/21/19  2247   SODIUM mmol/L  --  140   POTASSIUM mmol/L  --  4.4   CHLORIDE mmol/L  --  103   CO2 mmol/L  --  28.0   BUN mg/dL  --  21   CREATININE mg/dL  --  1.11   GLUCOSE mg/dL  --  220*   CALCIUM mg/dL  --  8.3*   ALT (SGPT) U/L  --  13   AST (SGOT) U/L  --  16   TROPONIN I ng/mL 0.00  --      Estimated Creatinine Clearance: 71.5 mL/min (by C-G formula based on SCr of 1.11 mg/dL).  Brief Urine Lab Results  (Last result in the past 365 days)      Color   Clarity   Blood   Leuk Est   Nitrite   Protein   CREAT   Urine HCG        01/22/19 0159 Dark Yellow Cloudy Negative Trace Negative 30 mg/dL (1+)             No results found for: BNP  Imaging Results (last 24 hours)     Procedure Component Value Units Date/Time    CT Abdomen Pelvis With Contrast [[id number]] Collected:  01/22/19 0102     Updated:  01/22/19 0146    Narrative:       EXAM:    CT Abdomen and Pelvis With Contrast     EXAM DATE/TIME:    1/22/2019 1:02 AM      CLINICAL HISTORY:    58 years old, female; Pain; Abdominal pain; Generalized; Additional info: Abd   pain, nausea, vomiting     TECHNIQUE:    Axial computed tomography images of the abdomen and pelvis with intravenous   contrast.    All CT scans at this facility use at least one of these dose optimization   techniques: automated exposure control; mA and/or kV adjustment per patient   size (includes targeted exams where dose is matched to clinical indication); or   iterative reconstruction.    Coronal reformatted images were created and reviewed.    MIP reconstructed images were created and reviewed.     CONTRAST:    90 ml of [id number] administered intravenously.     COMPARISON:    US ABDOMEN LIMITED 11/28/2018 9:11 AM     FINDINGS:    Lower thorax: No acute findings.     ABDOMEN:    Liver:  Liver appears normal with no focal abnormality.    Gallbladder and bile ducts:  Gallbladder is present and shows no evidence of   gallstone.    Pancreas:  Pancreas appears normal. No focal mass or peripancreatic   inflammation.    Spleen:  Spleen appears homogeneous without focal mass.    Adrenals:  Nonspecific 2.2 cm right adrenal nodule. Left adrenal is normal in   size.    Kidneys and ureters:  Kidneys appear normal, with no stone, solid mass or   hydronephrosis.    Stomach and bowel:  Stomach is distended with enteric contrast, and multiple   small bowel loops are prominent. There is a wide neck ventral hernia containing   fluid and nonobstructed bowel. Mild mesenteric edema and loops of inflamed   appearing small bowel are seen in the abdomen infraumbilical ventral hernia   contains nonobstructed bowel. No evidence of acute diverticulitis.    Appendix:  No evidence of acute appendicitis.     PELVIS:    Bladder:  Bladder appears normal.    Reproductive:  Uterus appears myomatous.     ABDOMEN and PELVIS:    Intraperitoneal space:  No pneumoperitoneum.    Bones/joints: No acute fracture. No dislocation.    Soft tissues:   See Stomach And Bowel Finding.    Vasculature:  Main portal and splenic veins enhance normally.    Lymph nodes: Normal. No enlarged lymph nodes.       Impression:       1. Multiple mildly prominent small bowel loops with increased mucosal   enhancement and mesenteric edema, suggesting inflammatory or infectious   enteritis. Although 2 ventral hernias are present. These do not appear to be   obstructing and the changes appear nonobstructed overall.   2. Myomatous uterus     THIS DOCUMENT HAS BEEN ELECTRONICALLY SIGNED BY RODERICK OLEA MD    CT Angiogram Chest With Contrast [[id number]] Collected:  01/22/19 0102     Updated:  01/22/19 0143    Narrative:       EXAM:  CT Angiography Chest With Contrast    EXAM DATE/TIME:  1/22/2019 1:02 AM    CLINICAL HISTORY:  58 years old, female; Signs and symptoms; Shortness of   breath; Additional info: SOA    TECHNIQUE:  Axial computed tomographic angiography images of the chest with   intravenous contrast using CT angiography protocol.  All CT scans at this facility use at least one of these dose optimization   techniques: automated exposure control; mA and/or kV adjustment per patient   size (includes targeted exams where dose is matched to clinical indication); or   iterative reconstruction.  MIP reconstructed images were created and reviewed.    COMPARISON:  CR XR CHEST 1 VW 8/20/2018 12:21 PM    FINDINGS:    No focal pulmonary artery filling defect to suggest acute pulmonary embolus.   No thoracic aortic aneurysm or dissection.     No enlarged mediastinal lymph nodes.   No pleural effusion or pneumothorax.   Pulmonary vascular/interstitial pattern does not suggest active pulmonary   edema.     No suspicious lung mass or air space process.   No central endobronchial lesion.     Limited visualization of upper abdomen shows no concerning finding.   Bony structures are unremarkable except for benign DISH changes.         Impression:       No evidence of acute pulmonary embolus.      No other acute or concerning focal intrathoracic abnormality.         THIS DOCUMENT HAS BEEN ELECTRONICALLY SIGNED BY RODERICK OLEA MD             Assessment/Plan   Assessment / Plan     Active Hospital Problems    Diagnosis Date Noted   • **Acute respiratory failure with hypercapnia (CMS/[facility]) [J96.02] 01/22/2019     Priority: High   • Gastroenteritis [K52.9] 01/22/2019     Priority: High   • COPD (chronic obstructive pulmonary disease) (CMS/[facility]) [J44.9] 01/22/2019     Priority: Medium   • CAD (coronary artery disease) [I25.10] 01/22/2019     Priority: Medium   • DM2 (diabetes mellitus, type 2) (CMS/[facility]) [E11.9] 01/22/2019     Priority: Medium   • Essential hypertension [I10] 01/22/2019     Priority: Medium   • Hypothyroidism (acquired) [E03.9] 01/22/2019     Priority: Low   • Hyperlipidemia [E78.5] 01/22/2019     Priority: Low         1. Acute on respiratory failure with hypercapnia:  - ABG  7.16/80.3/56.3 Patient subsequently placed on BiPAP. Repeat in 1 hour.  - patient does have a likely history of COPD ( former smoker) but no formal diagnosis. Imaging of chest clear.  - patient did receive a large dose of fentanyl in ED. She may have also aspirated during an episode of emesis.  - There is likely a potential contributory component of sleep apnea and obesity hypoventilation syndrome  - monitor closely  - scheduled duo nebs with additional pulmonary toilet as needed  - am labs    2. Gastroenteritis:  - with leukocytosis. Check procalcitonin  - patient administered vanc, zosyn, and flagyl in ED. One time dose. Hold additional antimicrobial therapy  - IVF   - am labs    3. CAD:  - s/p stent placement. On DAPT  - EKG as needed    4. Hypertension:  - labile. Holding antihypertensives. Resume as tolerated    5. IDDM:  - on 100 levemir daily plus 22 units humalog TID AC  - decrease levemir to 50 units. Hold home humalog and start SSI with scheduled accu checks    DVT prophylaxis:  [facility]    CODE STATUS:  Full  code  Code Status and Medical Interventions:   Ordered at: 01/22/19 0441     Level Of Support Discussed With:    Patient     Code Status:    CPR     Medical Interventions (Level of Support Prior to Arrest):    Full       Admission Status:  I believe this patient meets INPATIENT status due to the need for care which can only be reasonably provided in an hospital setting such as possible need for aggressive/expedited ancillary services and/or consultation services, IV medications, close physician monitoring, and/or procedures.  In such, I feel patient’s risk for adverse outcomes and need for care warrant INPATIENT evaluation and predict the patient’s care encounter to likely last beyond 2 midnights.        Electronically signed by Karen Stafford PA-C, 01/22/19, 4:54 AM.      Brief Attending Admission Attestation     I have seen and examined the patient, performing an independent face-to-face diagnostic evaluation with plan of care reviewed and developed with the advanced practice clinician (APC).      Brief Summary Statement/HPI:   Bozena De Leon is a 58 y.o. female with a past medical history significant for umbilical hernia, DM, COPD, MI, hypertension, hyperlipidemia, CAD with stent placement, and morbid obesity who presents with complaints of diffuse abdominal pain going on for the past two days associated with nausea. She has a leukocytosis but otherwise labs unremarkable. CT abd/pelvis revealed enteritis. She was also noted to be hypoxic. CTA chest was negative for acute process. She was given vanc and zosyn in the ED. Initially on presentation she was awake and alert; however, following the administration of Fentanyl she developed worsening AMS with somnolence. ABG obtained revealed hypercapnia so she was placed on bipap. She did have emesis so may have aspirated. She may have some elevated of RICHARD or obesity hypoventilation syndrome. ICU was consulted and she was admitted to the ICU.       Attending Physical  Exam:  General: moderate respiratory distress, confused and drowsy  Head: AT/NC  Eyes: no scleral icterus, PERRL  Neck: trachea midline  CV: RRR, no murmurs, no LE edema  Lungs: CTAB, no wheezing or crackles. Normal respiratory effort  Abd: Soft, large hernia, diffuse tenderness, non-distedned, bowel sounds normoactive   : no gagnon  Neuro: Unable to perform due to AMS.   Psych: somnolent, not following commands or speaking.   Skin: no lesions or rashes noted on exposed arms and legs        Brief Assessment/Plan :  See above for further detailed assessment and plan developed with APC which I have reviewed and/or edited.      Electronically signed by Fabiola Bateman MD, 01/22/19, 5:39 AM.       Critical Care time spent in direct patient care:    55 minutes (excluding procedure time, if applicable) including high complexity decision making to assess and treat vital organ system failure in this individual who has impairment of one or more vital organ systems such that there is a high probability of imminent or life threatening deterioration in the patient’s condition and failure to initiate the above interventions on an urgent basis would likely result in sudden, clinically significant or life threatening deterioration in the patient's condition.  6:43 AM

## 2019-01-22 NOTE — ED PROVIDER NOTES
Subjective   Ms. Bozena De Leon is a 58 y.o. female who presents to the ED with c/o abdominal pain onset last night. Patient reports last night she had sudden onset of right sided abdominal pain with accompanied nausea, vomiting and diarrhea. The abdominal pain has progressively worsened since initial onset and has an associated cramping sensation. She denies any fever, chills, congestion, cough, and any other acute symptoms at this time. Past medical history is significant for umbilical hernia, DM, COPD, MI, pneumonia, hypertension, hyperlipidemia, CAD with stent, syncope, tumor removal, hernia repair, , appendectomy, tonsillectomy, adenoidectomy, cardiac cath.           History provided by:  Patient  Abdominal Pain   Pain location:  RLQ and RUQ  Pain quality: cramping    Pain radiates to:  Does not radiate  Pain severity:  Moderate  Onset quality:  Sudden  Duration:  1 day  Timing:  Constant  Progression:  Worsening  Chronicity:  New  Relieved by:  None tried  Worsened by:  Nothing  Ineffective treatments:  None tried  Associated symptoms: diarrhea, nausea and vomiting    Associated symptoms: no chills, no cough and no fever        Review of Systems   Constitutional: Negative for chills and fever.   HENT: Negative for congestion.    Respiratory: Negative for cough.    Gastrointestinal: Positive for abdominal pain, diarrhea, nausea and vomiting.   All other systems reviewed and are negative.      Past Medical History:   Diagnosis Date   • COPD (chronic obstructive pulmonary disease) (CMS/HCC)    • Coronary artery disease    • Diabetes mellitus (CMS/HCC)    • Disease of thyroid gland    • Hernia, umbilical    • Hyperlipidemia    • Hypertension    • Myocardial infarction (CMS/HCC)    • Pneumonia    • Syncope        No Known Allergies    Past Surgical History:   Procedure Laterality Date   • ADENOIDECTOMY     • APPENDECTOMY     • CARDIAC CATHETERIZATION     •  SECTION     • CORONARY ANGIOPLASTY WITH  STENT PLACEMENT     • CORONARY STENT PLACEMENT     • HERNIA REPAIR     • INTERVENTIONAL RADIOLOGY PROCEDURE N/A 12/27/2018    Procedure: Vertebral artery stent;  Surgeon: Tanner Escobar MD;  Location: [facility] INVASIVE LOCATION;  Service: Interventional Radiology   • TONSILLECTOMY     • TUMOR REMOVAL      fibroid       History reviewed. No pertinent family history.    Social History     Socioeconomic History   • Marital status:      Spouse name: Not on file   • Number of children: Not on file   • Years of education: Not on file   • Highest education level: Not on file   Tobacco Use   • Smoking status: Former Smoker     Years: 30.00     Types: Cigarettes     Last attempt to quit: 2016     Years since quitting: 3.0   Substance and Sexual Activity   • Alcohol use: No   • Drug use: No   • Sexual activity: Defer         Objective   Physical Exam   Constitutional: She is oriented to person, place, and time. She appears well-developed and well-nourished. No distress.   She appears to be very uncomfortable. She is moaning frequently during the exam.    HENT:   Head: Normocephalic and atraumatic.   Eyes: Conjunctivae are normal. No scleral icterus.   Neck: Normal range of motion. Neck supple.   Cardiovascular: Normal rate, regular rhythm and normal heart sounds. Exam reveals no gallop and no friction rub.   No murmur heard.  Pulmonary/Chest: Effort normal and breath sounds normal. No stridor. No respiratory distress. She has no wheezes. She has no rales. She exhibits no tenderness.   Abdominal: Soft. Bowel sounds are normal. There is tenderness. There is no rebound and no guarding. A hernia is present.   Abdomen is obese. Large anterior abdominal wall hernia. Abdomen is distended. She is diffusely tender to palpation. No focal tenderness to palpation. Bowel sounds present. Significant scarring of abdomen due to previous surgeries.    Musculoskeletal: Normal range of motion. She exhibits no edema.   Neurological:  She is alert and oriented to person, place, and time.   Skin: Skin is warm and dry.   Psychiatric: She has a normal mood and affect. Her behavior is normal.   Nursing note and vitals reviewed.      Procedures         ED Course  ED Course as of Jan 22 0744   Mon Jan 21, 2019   2318 Current /63.  Pt appears much more comfortable at this time.  [CP]   [place] Jan 22, 2019   0148 Dr. Singh paged hospitalist to discuss admission.   [AT]   0154 Dr. Singh discussed case in detail with hospitalist who will admit.   [AT]   0320 Patient is more comfortable but states that she still has significant pain at time of admission.  She is requiring supplemental oxygen.  The reason for this is likely multifactorial.  I suspect is related to her obesity and potentially undiagnosed sleep apnea along with possible COPD which pt and family deny a history of.  It is also possible that she has aspirated during her episodes of emesis earlier this evening.  Regardless given her persistent hypoxia she will be admitted for further evaluation, observation, and management.  [CP]      ED Course User Index  [AT] Melchor Gary  [CP] Rohan Singh DO      Recent Results (from the past 24 hour(s))   Comprehensive Metabolic Panel    Collection Time: 01/21/19 10:47 PM   Result Value Ref Range    Glucose 220 (H) 70 - 100 mg/dL    BUN 21 9 - 23 mg/dL    Creatinine 1.11 0.60 - 1.30 mg/dL    Sodium 140 132 - 146 mmol/L    Potassium 4.4 3.5 - 5.5 mmol/L    Chloride 103 99 - 109 mmol/L    CO2 28.0 20.0 - 31.0 mmol/L    Calcium 8.3 (L) 8.7 - 10.4 mg/dL    Total Protein 6.3 5.7 - 8.2 g/dL    Albumin 3.93 3.20 - 4.80 g/dL    ALT (SGPT) 13 7 - 40 U/L    AST (SGOT) 16 0 - 33 U/L    Alkaline Phosphatase 138 (H) 25 - 100 U/L    Total Bilirubin 0.9 0.3 - 1.2 mg/dL    eGFR Non African Amer 50 (L) >60 mL/min/1.73    Globulin 2.4 gm/dL    A/G Ratio 1.7 1.5 - 2.5 g/dL    BUN/Creatinine Ratio 18.9 7.0 - 25.0    Anion Gap 9.0 3.0 - 11.0 mmol/L   Lipase     Collection Time: 01/21/19 10:47 PM   Result Value Ref Range    Lipase 25 6 - 51 U/L   Light Blue Top    Collection Time: 01/21/19 10:47 PM   Result Value Ref Range    Extra Tube hold for add-on    Green Top (Gel)    Collection Time: 01/21/19 10:47 PM   Result Value Ref Range    Extra Tube Hold for add-ons.    Lavender Top    Collection Time: 01/21/19 10:47 PM   Result Value Ref Range    Extra Tube hold for add-on    Gold Top - SST    Collection Time: 01/21/19 10:47 PM   Result Value Ref Range    Extra Tube Hold for add-ons.    CBC Auto Differential    Collection Time: 01/21/19 10:47 PM   Result Value Ref Range    WBC 14.99 (H) 3.50 - 10.80 10*3/mm3    RBC 5.93 (H) 3.89 - 5.14 10*6/mm3    Hemoglobin 15.2 11.5 - 15.5 g/dL    Hematocrit 50.8 (H) 34.5 - 44.0 %    MCV 85.7 80.0 - 99.0 fL    MCH 25.6 (L) 27.0 - 31.0 pg    MCHC 29.9 (L) 32.0 - 36.0 g/dL    RDW 16.1 (H) 11.3 - 14.5 %    RDW-SD 50.7 37.0 - 54.0 fl    MPV 10.6 6.0 - 12.0 fL    Platelets 370 150 - 450 10*3/mm3    Neutrophil % 79.2 (H) 41.0 - 71.0 %    Lymphocyte % 14.7 (L) 24.0 - 44.0 %    Monocyte % 5.3 0.0 - 12.0 %    Eosinophil % 0.6 0.0 - 3.0 %    Basophil % 0.2 0.0 - 1.0 %    Immature Grans % 0.3 0.0 - 0.6 %    Neutrophils, Absolute 11.87 (H) 1.50 - 8.30 10*3/mm3    Lymphocytes, Absolute 2.21 0.60 - 4.80 10*3/mm3    Monocytes, Absolute 0.79 0.00 - 1.00 10*3/mm3    Eosinophils, Absolute 0.09 0.00 - 0.30 10*3/mm3    Basophils, Absolute 0.03 0.00 - 0.20 10*3/mm3    Immature Grans, Absolute 0.04 (H) 0.00 - 0.03 10*3/mm3   Lactic Acid, Plasma    Collection Time: 01/21/19 10:47 PM   Result Value Ref Range    Lactate 1.4 0.5 - 2.0 mmol/L   POC Troponin, Rapid    Collection Time: 01/21/19 10:55 PM   Result Value Ref Range    Troponin I 0.00 0.00 - 0.07 ng/mL   Urinalysis With Microscopic If Indicated (No Culture) - Urine, Clean Catch    Collection Time: 01/22/19  1:59 AM   Result Value Ref Range    Color, UA Dark Yellow (A) Yellow, Straw    Appearance, UA Cloudy  (A) Clear    pH, UA <=5.0 5.0 - 8.0    Specific Gravity, UA 1.045 (H) 1.001 - 1.030    Glucose, UA Negative Negative    Ketones, UA 15 mg/dL (1+) (A) Negative    Bilirubin, UA Moderate (2+) (A) Negative    Blood, UA Negative Negative    Protein, UA 30 mg/dL (1+) (A) Negative    Leuk Esterase, UA Trace (A) Negative    Nitrite, UA Negative Negative    Urobilinogen, UA 1.0 E.U./dL 0.2 - 1.0 E.U./dL   Urinalysis, Microscopic Only - Urine, Clean Catch    Collection Time: 01/22/19  1:59 AM   Result Value Ref Range    RBC, UA 0-2 None Seen, 0-2 /HPF    WBC, UA 0-2 None Seen, 0-2 /HPF    Bacteria, UA None Seen None Seen, Trace /HPF    Squamous Epithelial Cells, UA 3-6 (A) None Seen, 0-2 /HPF    Hyaline Casts, UA 13-20 0 - 6 /LPF    Methodology Automated Microscopy      Note: In addition to lab results from this visit, the labs listed above may include labs taken at another facility or during a different encounter within the last 24 hours. Please correlate lab times with ED admission and discharge times for further clarification of the services performed during this visit.    CT Abdomen Pelvis With Contrast   Final Result   1. Multiple mildly prominent small bowel loops with increased mucosal    enhancement and mesenteric edema, suggesting inflammatory or infectious    enteritis. Although 2 ventral hernias are present. These do not appear to be    obstructing and the changes appear nonobstructed overall.    2. Myomatous uterus       THIS DOCUMENT HAS BEEN ELECTRONICALLY SIGNED BY RODERICK OLEA MD      CT Angiogram Chest With Contrast   Final Result   No evidence of acute pulmonary embolus.       No other acute or concerning focal intrathoracic abnormality.             THIS DOCUMENT HAS BEEN ELECTRONICALLY SIGNED BY RODERICK OLEA MD        Vitals:    01/21/19 2357 01/22/19 0049 01/22/19 0150 01/22/19 0202   BP:  139/67     Pulse:  94 104 100   Resp:       Temp:       TempSrc:       SpO2: 93% 90% (!) 89% 90%   Weight:        Height:         Medications   sodium chloride 0.9 % flush 10 mL (not administered)   sodium chloride 0.9 % infusion (125 mL/hr Intravenous New Bag 1/21/19 2259)   methylPREDNISolone sodium succinate (SOLU-Medrol) injection 125 mg (not administered)   ipratropium-albuterol (DUO-NEB) nebulizer solution 3 mL (not administered)   ondansetron (ZOFRAN) injection 4 mg (4 mg Intravenous Given 1/21/19 2232)   promethazine (PHENERGAN) injection 12.5 mg (12.5 mg Intravenous Given 1/21/19 2236)   diatrizoate meglumine-sodium (GASTROGRAFIN) 66-10 % solution 15 mL (15 mL Oral Given 1/21/19 2257)   fentaNYL citrate (PF) (SUBLIMAZE) injection 50 mcg (50 mcg Intravenous Given 1/21/19 2230)   iopamidol (ISOVUE-300) 61 % injection 100 mL (100 mL Intravenous Given 1/22/19 0106)     ECG/EMG Results (last 24 hours)     Procedure Component Value Units Date/Time    ECG 12 Lead [[id number]] Collected:  01/21/19 2216     Updated:  01/21/19 2218        ECG 12 Lead                             MDM    Final diagnoses:   Gastroenteritis   Nausea, vomiting, and diarrhea   Hypoxia   Generalized abdominal pain   Abdominal wall hernia   Acute respiratory failure with hypoxia and hypercapnia (CMS/HCC)       Documentation assistance provided by agustina Gary.  Information recorded by the scribe was done at my direction and has been verified and validated by me.     Melchor Gary  01/21/19 2234       Melchor Gary  01/21/19 2255       Melchor Gary  01/22/19 0338       Rohan Singh DO  01/22/19 8110

## 2019-01-22 NOTE — PLAN OF CARE
Problem: Patient Care Overview  Goal: Plan of Care Review  Outcome: Ongoing (interventions implemented as appropriate)   01/22/19 9476   Coping/Psychosocial   Plan of Care Reviewed With daughter;son   Plan of Care Review   Progress declining   OTHER   Outcome Summary pt remains on vasopressors and BIPAP remains hypotensive. TLDL placed BIPAP in place. Dr Tabor discussed plan of care and prognossess with family. comfort measures implemented

## 2019-01-22 NOTE — PROGRESS NOTES
Patient moved up from emergency department to ICU.  She remains on BiPAP.  She is minimally responsive (only to deep stimulation).  She was significantly hypotensive on arrival to the intensive care unit and was on maximum dose phenylephrine.  Fluid bolus ordered.  Pro-calcitonin was still pending (ordered at 3:30 AM in emergency department).  This was re-drawn and is still pending.  I went ahead and ordered Zosyn empirically as the patient still has significant abdominal distention and tenderness of the abdomen in the setting of hypotension.    Subsequently placed left internal jugular central venous catheter for pressors and CVP monitoring.  Repeat labs were drawn including arterial blood gas and lactic acid.  These are currently pending.  Repeat BMP shows worsening creatinine consistent with acute kidney injury.  Daughter confirms patient is a DO NOT INTUBATE.  No CPR.    35 minutes spent on critical care by me personally excluding time spent on separately billed procedures.

## 2019-01-22 NOTE — CONSULTS
History of present illness: Ms. De Leon is a 58-year-old with a history of COPD, coronary disease, diabetes and hypertension.  There is no family available at the time of this consultation.  The patient had reported history WITH several months of abdominal pain.  She presented to the hospital with nausea, vomiting and abdominal discomfort.  She subsequently became less responsive and was transferred to the intensive care unit.    PMH: COPD            Morbid obesity            Diabetes mellitus            Obstructive sleep apnea            Hypertension             Surgical history: Appendectomy                             Catheter with stent placement                             Hernia repair                             Tonsillectomy                             Vertebral artery stent placement  Allergies: No known drug allergies  Meds: see med list  Fam hx: No known GI cancer  Social hx: 30-pack-year history of tobacco use quit in 2016  ROS: see HPI    Phy Exam: Gen- female intubated at this time,does move some extremities                     HEENT- no icterus, no trauma                     Chest- clear to auscultation                     Cardiac- s1s2, no murmur or gallop                     Abd- large ventral hernia with ecchymosis, tender with palpation, no bowel sounds                      Ext- no edema, no cyanosis                     Neuro- responds to stimuli    Labs: Elevated pro-calcitonin elevated, white blood cell count 15,000, creatinine 2.1 and pH 7.13     Impression: Abdominal pain with acidemia and leukocytosis- the patient has a large ventral hernia with an abnormal abdominal examination.  The most likely diagnosis is bowel ischemia secondary to incarcerated hernia.  There is report of free air on recent abdominal x-ray.  This would clinically fit the history at this time.  She is hemodynamically unstable at this time requiring pressure support.    Plan: The family was unavailable at the time of this  consultation.  I did speak with Dr. Renee.

## 2019-01-22 NOTE — PROGRESS NOTES
Patient's ABG and lactic acid show a worsening metabolic acidosis which is further evidence supporting my concern for gut ischemia, likely secondary to incarcerated hernia.    I discussed the grave situation with the patient's daughter at the bedside.  The patient previously had expressed wishes to be a DO NOT INTUBATE with no CPR.  The daughter states that this would be her wish and that she would not want to have surgery in the event that she would need to be on mechanical ventilation for any significant period of time.  Additionally, she states that the patient did not want to have additional surgery on her ventral hernia because she was worried about the risks and especially would not want surgery in this situation, given the extremely high risk.  I offered to consult surgery however the patient's family did not wish for a surgical consultation.    At this point, they want to continue the current level of care.  They want to continue BiPAP for the time being while they call additional family members.  They are interested in otherwise mainly pursuing comfort measures with IV narcotic drips, understanding that there is some risk that pain medication may further lower her blood pressure.    I explained to the family that I would be available in the event that they have any additional questions or changes to the above wishes and will continue to monitor the situation closely.    I discussed the patient with Dr. Herrera with gastroenterology and Dr. Kennedy with radiology (who felt there was some evidence of free air within hernia sac on earlier CT scan).      I spent an additional 33 minutes on critical care, most of which was related to family discussions on end of life care and discussions with other providers.

## 2019-01-22 NOTE — H&P
"  CRITICAL CARE ADMISSION NOTE    Chief Complaint     Acute respiratory failure with hypercapnia (CMS/HCC)    History of Present Illness     Bozena De Leon is a 58 y.o. female who  has a past medical history of COPD (chronic obstructive pulmonary disease) (CMS/HCC), Coronary artery disease, Diabetes mellitus (CMS/HCC), Disease of thyroid gland, Hernia, umbilical, Hyperlipidemia, Hypertension, Myocardial infarction (CMS/HCC), Pneumonia, and Syncope.    The patient has had multiple months of abdominal pain which has worsened precipitously over the last 2 days.  On 1/21 she developed nausea and vomiting w/ subjective fever and diaphoresis..  ED records indicate diarrhea as well, but family is unaware of this and the patient is not able to contribute to the ROS presently due to sedation.      Per family she has been told that she needs to have a surgical evaluation of her gallbladder.  CT of the abdomen does not reveal any stones or evidence of inflammation.  It does show 2 large ventral hernias, both w/ nonobstructed bowel (one with \"inflamed appearing small bowel\").      ABG obtained in the ED showed pH of 7.16, pCO2 80.3, pO2 56.3.  Placed on BIPAP and repeat ABG showed pH of 7.13, pCO2 78.4.      The patient family indicate that she is been followed by a pulmonary physician at the [facility].  They're not sure if a sleep study has ever been recommended but 1 has never been performed.  She smokes up to the time of this admission  She hasn't had any plans for smoking cessation  They deny any regular narcotic or benzodiazepine use    Problem List, Surgical History, Family, Social History, and ROS     Acute/Chronic respiratory failure with hypercapnia & hypoxia    COPD (chronic obstructive pulmonary disease) (CMS/HCC)    Obesity hypoventilation syndrome (CMS/HCC)    Morbid obesity with BMI of 50.0-59.9, adult (CMS/[facility])    R/O RICHARD (obstructive sleep apnea)    CAD (coronary artery disease)    DM2 (diabetes mellitus, " type 2) (CMS/HCC)    Hypothyroidism (acquired)    Essential hypertension    Hyperlipidemia    Gastroenteritis    Past Surgical History:   Procedure Laterality Date   • ADENOIDECTOMY     • APPENDECTOMY     • CARDIAC CATHETERIZATION     •  SECTION     • CORONARY ANGIOPLASTY WITH STENT PLACEMENT     • CORONARY STENT PLACEMENT     • HERNIA REPAIR     • INTERVENTIONAL RADIOLOGY PROCEDURE N/A 2018    Procedure: Vertebral artery stent;  Surgeon: Tanner Escobar MD;  Location: [facility] INVASIVE LOCATION;  Service: Interventional Radiology   • TONSILLECTOMY     • TUMOR REMOVAL      fibroid       No Known Allergies  No current facility-administered medications on file prior to encounter.      Current Outpatient Medications on File Prior to Encounter   Medication Sig   • ASPIRIN ADULT LOW DOSE 81 MG EC tablet Take 81 mg by mouth Daily.   • atorvastatin (LIPITOR) 80 MG tablet Take 80 mg by mouth Every Night.   • carvedilol (COREG) 12.5 MG tablet Take 12.5 mg by mouth 2 (Two) Times a Day With Meals.   • clopidogrel (PLAVIX) 75 MG tablet    • LANTUS 100 UNIT/ML injection Inject 100 Units under the skin into the appropriate area as directed Daily.   • levothyroxine (SYNTHROID, LEVOTHROID) 100 MCG tablet Take 100 mcg by mouth Daily.   • losartan (COZAAR) 50 MG tablet Take 50 mg by mouth Daily.   • meclizine (ANTIVERT) 25 MG tablet Take 1 tablet by mouth Every 6 (Six) Hours As Needed for dizziness.   • NOVOLOG 100 UNIT/ML injection Inject 22 Units under the skin into the appropriate area as directed 3 (Three) Times a Day Before Meals.     MEDICATION LIST AND ALLERGIES REVIEWED.    History reviewed. No pertinent family history.  Social History     Tobacco Use   • Smoking status: Former Smoker     Years: 30.00     Types: Cigarettes     Last attempt to quit: 2016     Years since quitting: 3.0   Substance Use Topics   • Alcohol use: No   • Drug use: No     Social History     Social History Narrative   • Not on file  "    FAMILY AND SOCIAL HISTORY REVIEWED.    Review of Systems   Constitutional: Positive for diaphoresis and fever.   Respiratory: Positive for shortness of breath.    Cardiovascular: Negative.    Gastrointestinal: Positive for abdominal distention, abdominal pain, nausea and vomiting. Negative for blood in stool.     ALL OTHER SYSTEMS REVIEWED AND ARE NEGATIVE.    Physical Exam and Clinical Information   BP (!) 91/29   Pulse 89   Temp 97.7 °F (36.5 °C) (Oral)   Resp 20   Ht 160 cm (63\")   Wt 129 kg (285 lb)   SpO2 91%   BMI 50.49 kg/m²   Physical Exam   GENERAL: Lethargic, on BiPap. No distress   HEENT: No adenopathy or thyromegaly   LUNGS: Decreased BS bilaterally without wheezes   HEART: HRR without murmurs   GI: Obese, soft, ventral hernia above the umbilicus. Can reduce the hernia without difficulty. Mildly diffusely tender. No guarding or rebound   EXTREMITIES: Trace edema, no cyanosis or clubbing   NEURO/PSYCH: Lethargic. Opens eyes to stimulation. Doesn't follow commands.    Results from last 7 days   Lab Units 01/21/19  2247   WBC 10*3/mm3 14.99*   HEMOGLOBIN g/dL 15.2   PLATELETS 10*3/mm3 370     Results from last 7 days   Lab Units 01/21/19  2247   SODIUM mmol/L 140   POTASSIUM mmol/L 4.4   CO2 mmol/L 28.0   BUN mg/dL 21   CREATININE mg/dL 1.11   GLUCOSE mg/dL 220*     Estimated Creatinine Clearance: 71.5 mL/min (by C-G formula based on SCr of 1.11 mg/dL).      Results from last 7 days   Lab Units 01/22/19  0506   PH, ARTERIAL pH units 7.131*   PCO2, ARTERIAL mm Hg 78.4*   PO2 ART mm Hg 100.0     Lab Results   Component Value Date    LACTATE 1.4 01/21/2019        IMAGES: CT scan of the chest reveals no consolidation, effusions, or infiltrates.  No pulmonary emboli    CT of the Abd/Pelvis reviewed and per radiologists report    I reviewed the patient's results and images.     Assesment     Active Hospital Problems    Diagnosis   • **Acute/Chronic respiratory failure with hypercapnia & hypoxia   • " COPD (chronic obstructive pulmonary disease) (CMS/[facility])   • Obesity hypoventilation syndrome (CMS/[facility])   • Morbid obesity with BMI of 50.0-59.9, adult (CMS/[facility])   • R/O RICHARD (obstructive sleep apnea)   • CAD (coronary artery disease)   • DM2 (diabetes mellitus, type 2) (CMS/[facility])   • Hypothyroidism (acquired)   • Essential hypertension   • Hyperlipidemia   • Gastroenteritis     Plan/Recommendations     - ICU  - BIPAP (No intubation per patients request)  - BD  - continue DAPT  - SSI, levemir 25 units BID  - follow up procalcitonin, hold on ABX for now  - AND  - Urine Drug Screen  - Assuming improves, consider Trilogy at home and OPT sleep study  - GI Consultation for A/C abdominal pain; high risk for any sedation for procedures at this point    I have seen and examined patient, performing a face-to-face diagnostic evaluation with plan of care reviewed and developed with APRN and nursing staff. I have addended and modified the above history of present illness, physical examination, and assessment and plan to reflect my findings and impressions.    Critical Care time spent in direct patient care: 45 minutes (excluding procedure time, if applicable) including high complexity decision making to assess, manipulate, and support vital organ system failure in this individual who has impairment of one or more vital organ systems such that there is a high probability of imminent or life threatening deterioration in the patient’s condition.    GEORGINA Grove MD  Pulmonary and Critical Care Medicine  01/22/19 6:24 AM     CC: Alin Nagy MD    Please note that portions of this note were completed with a voice recognition program. Efforts were made to edit the dictations, but occasionally words are mistranscribed.

## 2019-01-23 NOTE — SIGNIFICANT NOTE
Exam confirms with auscultation zero audible heart tones and zero audible respirations. Ms.Penny SHOSHANA De Leon was pronounced dead at 0330 1/23/19.  MD notified by Patient's RN.    Tuan Garzon RN  Clinical House Supervisor  1/23/2019 4:01 AM

## 2019-01-23 NOTE — DISCHARGE SUMMARY
Admit date: 2019  Date/Time of Death:  2019 @ 0330    Admission Diagnosis:  Present on Admission:  • COPD (chronic obstructive pulmonary disease) (CMS/[facility])  • CAD (coronary artery disease)  • DM2 (diabetes mellitus, type 2) (CMS/[facility])  • Hypothyroidism (acquired)  • Essential hypertension  • Hyperlipidemia  • Acute/Chronic respiratory failure with hypercapnia & hypoxia  • Gastroenteritis  • Obesity hypoventilation syndrome (CMS/HCC)  • R/O RICHARD (obstructive sleep apnea)      Discharge Diagnosis:   Problem List Items Addressed This Visit        High    * (Principal) Acute/Chronic respiratory failure with hypercapnia & hypoxia    Relevant Orders    Insert Central Line At Bedside (Completed)       Medium    Gastroenteritis - Primary      Other Visit Diagnoses     Nausea, vomiting, and diarrhea        Generalized abdominal pain        Abdominal wall hernia        Acute respiratory failure with hypoxia and hypercapnia (CMS/HCC)                Hospital Course:  Patient is a 58 y.o. female presented with abdominal pain on .  She has had ongoing issues w/ ventral hernia and abdominal pain for several months, with worsening ~2 days prior to admission.    Patient presented to the ED w/ acute respiratory distress, nausea/vomiting.  Lab work revealed acidemia & leukocytosis c/w bowel ischemia.  Gastroenterology was consulted.  A CVC was placed for pressor therapy.    The clinical status was discussed w/ the family and they stated that the patient had not wanted surgery prior to this and did not think that she would want it given her advanced illness.  They desired that eh patient be kept comfortable, which was done w/ narcotic infusion.    The patient went asystolic @ 0330.      Procedures Performed:     1135 Insert Central Line At Bedside    Consults:   Dr. NOREEN Herrera, Gastroenterology    Disposition:        Marbin Rose, APRN  19  5:21 AM    Time: Discharge 18 min

## 2022-04-26 NOTE — H&P (VIEW-ONLY)
NAME: KRISTOFER SHEPARD   DOS: 2018  : 1960  PCP: Alin Nagy MD    Chief Complaint:    Chief Complaint   Patient presents with   • Dizziness, near syncop     Vertebrobasilar insufficiency       History of Present Illness:  58 y.o. female who was seen in the [facility] emergency department on 2018, with episodes of dizziness and confusion, and was found to have high-grade bilateral vertebral artery stenoses.  She was on a dual antiplatelet regimen, along with statin therapy, and was scheduled to follow-up with me as an outpatient.  However, for various reasons, she presents today for follow-up of her vertebral artery stenoses and probable symptoms of vertebrobasilar insufficiency.  She's had at least 2–3 episodes of extreme dizziness and near syncope since her last hospitalization.  She is compliant with her dual antiplatelet regimen and statin therapy, but is admittedly a poorly controlled diabetic (hemoglobin A1c 9.3 on 2018).    Past Medical History:  Past Medical History:   Diagnosis Date   • COPD (chronic obstructive pulmonary disease) (CMS/HCC)    • Diabetes mellitus (CMS/HCC)    • Disease of thyroid gland    • Hernia, umbilical    • Hyperlipidemia    • Hypertension    • Myocardial infarction (CMS/HCC)    • Pneumonia        Past Surgical History:  Past Surgical History:   Procedure Laterality Date   • ADENOIDECTOMY     • APPENDECTOMY     •  SECTION     • CORONARY ANGIOPLASTY WITH STENT PLACEMENT     • HERNIA REPAIR     • TONSILLECTOMY     • TUMOR REMOVAL         Review of Systems:        Review of Systems   Constitutional: Negative for activity change, appetite change, chills, diaphoresis, fatigue, fever and unexpected weight change.   HENT: Negative for congestion, dental problem, drooling, ear discharge, ear pain, facial swelling, hearing loss, mouth sores, nosebleeds, postnasal drip, rhinorrhea, sinus pressure, sneezing, sore throat, tinnitus, trouble swallowing  and voice change.    Eyes: Negative for photophobia, pain, discharge, redness, itching and visual disturbance.   Respiratory: Negative for apnea, cough, choking, chest tightness, shortness of breath, wheezing and stridor.    Cardiovascular: Negative for chest pain, palpitations and leg swelling.   Gastrointestinal: Negative for abdominal distention, abdominal pain, anal bleeding, blood in stool, constipation, diarrhea, nausea, rectal pain and vomiting.   Endocrine: Negative for cold intolerance, heat intolerance, polydipsia, polyphagia and polyuria.   Genitourinary: Negative for decreased urine volume, difficulty urinating, dysuria, enuresis, flank pain, frequency, genital sores, hematuria and urgency.   Musculoskeletal: Negative for arthralgias, back pain, gait problem, joint swelling, myalgias, neck pain and neck stiffness.   Skin: Negative for color change, pallor, rash and wound.   Allergic/Immunologic: Negative for environmental allergies, food allergies and immunocompromised state.   Neurological: Positive for dizziness. Negative for tremors, seizures, syncope, facial asymmetry, speech difficulty, weakness, light-headedness, numbness and headaches.   Hematological: Negative for adenopathy. Does not bruise/bleed easily.   Psychiatric/Behavioral: Negative for agitation, behavioral problems, confusion, decreased concentration, dysphoric mood, hallucinations, self-injury, sleep disturbance and suicidal ideas. The patient is not nervous/anxious and is not hyperactive.         Medications    Current Outpatient Medications:   •  ASPIRIN ADULT LOW DOSE 81 MG EC tablet, , Disp: , Rfl:   •  atorvastatin (LIPITOR) 80 MG tablet, , Disp: , Rfl:   •  carvedilol (COREG) 12.5 MG tablet, , Disp: , Rfl:   •  LANTUS 100 UNIT/ML injection, , Disp: , Rfl:   •  levothyroxine (SYNTHROID, LEVOTHROID) 100 MCG tablet, , Disp: , Rfl:   •  losartan (COZAAR) 50 MG tablet, , Disp: , Rfl:   •  meclizine (ANTIVERT) 25 MG tablet, Take 1  "tablet by mouth Every 6 (Six) Hours As Needed for dizziness., Disp: 28 tablet, Rfl: 0  •  NOVOLOG 100 UNIT/ML injection, , Disp: , Rfl:   •  clopidogrel (PLAVIX) 75 MG tablet, , Disp: , Rfl:     Allergies:  No Known Allergies    Social Hx:  Social History     Tobacco Use   • Smoking status: Former Smoker     Years: 30.00     Types: Cigarettes     Last attempt to quit: 2018     Years since quittin.4   Substance Use Topics   • Alcohol use: No   • Drug use: No       Family Hx:  History reviewed. No pertinent family history.    Review of Imaging:  MR angiogram of the head neck dated 2018 demonstrates focal areas of \"high-grade\" stenosis involving the right vertebral artery origin and the pre-–vertebral portions of the left subclavian artery.  The right vertebral artery is dominant in nature, and the left vertebral artery is markedly hypoplastic and appears to terminate into the PICA on the prior MR angiogram of the head.  Mild plaque formation is noted involving the origin of the left common carotid artery.  The carotid bifurcations are without hemodynamically significant disease.  Carotid duplex from the vascular lab at the [place] surgeon's office dated 2018 demonstrates high-grade stenoses involving the bilateral vertebral artery origins, with peak velocities of 294 cm/s on the left, and at 231 cm/s on the right.    Physical Examination:  Vitals:    18 1328   BP: 130/76   Temp: 97.7 °F (36.5 °C)        General Appearance:   Well developed, well nourished, well groomed, alert, and cooperative.  Cardiovascular: Regular rate and rhythm. No carotid bruits      Neurological examination:  Neurologic Exam     Mental Status   Oriented to person, place, and time.   Speech: speech is normal   Level of consciousness: alert    Cranial Nerves   Cranial nerves II through XII intact.     Motor Exam     Strength   Strength 5/5 throughout.     Sensory Exam   Light touch normal.     Gait, Coordination, and " "Reflexes     Gait  Gait: normal      Diagnoses/Plan:    Ms. De Leon is a 58 y.o. female recurrent episodes of dizziness and near syncope which appear to represent to vertebrobasilar insufficiency related to high-grade stenoses involving the cervical vertebral and subclavian vasculature.  Her right vertebral artery is clearly the \"dominant\" vertebral and has a \"high-grade\" stenosis involving the right vertebral artery origin on prior MR angiogram.  I would estimate her right vertebral artery stenosis to be in excess of 90%.  Given her recurrent symptoms of vertebrobasilar insufficiency, despite aggressive medical therapy with a dual antiplatelet regimen and statin therapy, I think it is reasonable to pursue diagnostic angiography with possible intervention (vertebral artery origin stent).  Ms. De Leon is in agreement with this, and wishes to pursue treatment as soon as possible.  We will make arrangements for her to undergo diagnostic angiography with possible vertebral intervention in the next couple of weeks or so.    During the interim, she is going to follow-up with her PCP in regards to aggressive diabetes control.          " Mucosal Advancement Flap Text: Given the location of the defect, shape of the defect and the proximity to free margins a mucosal advancement flap was deemed most appropriate. Incisions were made with a 15 blade scalpel in the appropriate fashion along the cutaneous vermillion border and the mucosal lip. The remaining actinically damaged mucosal tissue was excised.  The mucosal advancement flap was then elevated to the gingival sulcus with care taken to preserve the neurovascular structures and advanced into the primary defect. Care was taken to ensure that precise realignment of the vermillion border was achieved.

## (undated) DEVICE — CATH TEMPO 5F BER 100CM: Brand: TEMPO

## (undated) DEVICE — ROTATING HEMOSTATIC VALVE .096": Brand: RHV

## (undated) DEVICE — ANGIO-SEAL VIP VASCULAR CLOSURE DEVICE: Brand: ANGIO-SEAL

## (undated) DEVICE — Device

## (undated) DEVICE — LN INJ CONTRST FLXCIL HP F/M LL 1200PSI48

## (undated) DEVICE — LEX NEURO ANGIOGRAPHY: Brand: MEDLINE INDUSTRIES, INC.

## (undated) DEVICE — INTRO SHEATH ART/FEM ENGAGE .038 5F12CM

## (undated) DEVICE — CATH DIAG EXPO PIG .045IN 5F 110CM

## (undated) DEVICE — ST ACC MICROPUNCTURE .018 TRANSLSS/SS/TP 5F/10CM 21G/7CM